# Patient Record
Sex: MALE | Race: WHITE | NOT HISPANIC OR LATINO | Employment: FULL TIME | URBAN - METROPOLITAN AREA
[De-identification: names, ages, dates, MRNs, and addresses within clinical notes are randomized per-mention and may not be internally consistent; named-entity substitution may affect disease eponyms.]

---

## 2019-06-27 ENCOUNTER — TELEPHONE (OUTPATIENT)
Dept: INTERNAL MEDICINE | Facility: CLINIC | Age: 49
End: 2019-06-27

## 2019-06-27 NOTE — TELEPHONE ENCOUNTER
Called patient. No answered. Left message with Primary Care phone number to call back to schedule annual appointment with PCP.    July 3, 2019  1:26 PM    Patient has upcoming appointment on 8/12/19.    Lien Hernandez RN

## 2019-06-27 NOTE — TELEPHONE ENCOUNTER
Email received from Dr. Diallo:    Please have him scheduled for a PE.  JL  ---------- Forwarded message ---------  From: Raphael Gudino <[email or web address]>  Date: Wed, Jun 26, 2019 at 3:50 PM  Subject: Antarctic medical exam  To: Ebenezer Diallo [email or web address]>  Cc: [email or web address]>      Dear Dr. Diallo,    It is time for my Antarctic medical exam - could I please have an appointment   to see you?    Thanks,    Raphael    --   Raphael Gudino - Professor - Physics  [facility]  [address] Physics and Nanotechnology Building  [address] [place] [address]  Tel: [phone]  Fax: [phone]  email: [email or web address]

## 2019-07-30 ENCOUNTER — DOCUMENTATION ONLY (OUTPATIENT)
Dept: CARE COORDINATION | Facility: CLINIC | Age: 49
End: 2019-07-30

## 2019-07-30 DIAGNOSIS — Z02.89 ENCOUNTER FOR OCCUPATIONAL HEALTH ASSESSMENT: Primary | ICD-10-CM

## 2019-08-08 ENCOUNTER — PRE VISIT (OUTPATIENT)
Dept: INTERNAL MEDICINE | Facility: CLINIC | Age: 49
End: 2019-08-08

## 2019-08-08 NOTE — TELEPHONE ENCOUNTER
[facility]  Dept: [phone]     Patient: Raghav Gudino   : 1970  MRN: [id number]  Encounter: 19       Pre Visit Assessment    Health Maintenance Due   Topic Date Due     COLONOSCOPY  1978     PREVENTIVE CARE VISIT  10/19/2017     PHQ-2  2019     ZOSTER IMMUNIZATION (1 of 2) 2018     Chart Reviewed for Labs needed/Health Maintenance: Yes   If labs needed, orders placed:  No labs needed ,   Lab appointment scheduled:  Jade Hawk at 4:12 PM on 2019

## 2019-08-12 ENCOUNTER — TELEPHONE (OUTPATIENT)
Dept: GASTROENTEROLOGY | Facility: CLINIC | Age: 49
End: 2019-08-12

## 2019-08-12 ENCOUNTER — OFFICE VISIT (OUTPATIENT)
Dept: INTERNAL MEDICINE | Facility: CLINIC | Age: 49
End: 2019-08-12
Payer: COMMERCIAL

## 2019-08-12 VITALS
BODY MASS INDEX: 24.23 KG/M2 | WEIGHT: 182.8 LBS | SYSTOLIC BLOOD PRESSURE: 118 MMHG | HEART RATE: 66 BPM | HEIGHT: 73 IN | DIASTOLIC BLOOD PRESSURE: 79 MMHG

## 2019-08-12 DIAGNOSIS — Z56.89 OCCUPATIONAL HEALTH PROBLEM: Primary | ICD-10-CM

## 2019-08-12 DIAGNOSIS — Z12.11 SPECIAL SCREENING FOR MALIGNANT NEOPLASMS, COLON: ICD-10-CM

## 2019-08-12 ASSESSMENT — PAIN SCALES - GENERAL: PAINLEVEL: NO PAIN (0)

## 2019-08-12 ASSESSMENT — MIFFLIN-ST. JEOR: SCORE: 1739.09

## 2019-08-12 NOTE — PROGRESS NOTES
The 10-year ASCVD risk score (Brycepaty ALLISON Jr., et al., 2013) is: 1.8%    Values used to calculate the score:      Age: 48 years      Sex: Male      Is Non- : No      Diabetic: No      Tobacco smoker: No      Systolic Blood Pressure: 118 mmHg      Is BP treated: No      HDL Cholesterol: 62 mg/dL      Total Cholesterol: 165 mg/dL       HPI  48-year-old  presents today for physical examination prior to his annual and ar[place] expedition.  He has the typical list of labs and testing needed for this.  New this year is an EKG a guaiac test.  We also discussed his need for colonoscopy we will get him scheduled for this.  He is been in good health is exercising regularly is doing a combination of strength and cardiovascular training tolerating this well without symptoms or problems.  He is eating healthy he has no specific complaints or concerns today.  Past Medical History:   Diagnosis Date     Anxiety      GERD (gastroesophageal reflux disease)      Past Surgical History:   Procedure Laterality Date     ENT SURGERY      wisdom teeth out     HC ESOPH/GAS REFLUX TEST W NASAL IMPED >1 HR  10/20/2011    Procedure:ESOPHAGEAL IMPEDENCE FUNCTION TEST WITH 24 HOUR PH GREATER THAN 1 HOUR; Surgeon:SIMA CONCEPCION; Location:[facility]     No family history on file.  Social History     Socioeconomic History     Marital status:      Spouse name: None     Number of children: None     Years of education: None     Highest education level: None   Occupational History     Occupation:      Employer: [employer]   Social Needs     Financial resource strain: None     Food insecurity:     Worry: None     Inability: None     Transportation needs:     Medical: None     Non-medical: None   Tobacco Use     Smoking status: Former Smoker     Types: Cigarettes     Smokeless tobacco: Never Used   Substance and Sexual Activity     Alcohol use: Yes     Alcohol/week: 2.5 oz     Types: 5 drink(s) per  "week     Comment: w/e     Drug use: No     Sexual activity: Yes     Partners: Female   Lifestyle     Physical activity:     Days per week: None     Minutes per session: None     Stress: None   Relationships     Social connections:     Talks on phone: None     Gets together: None     Attends Mandaen service: None     Active member of club or organization: None     Attends meetings of clubs or organizations: None     Relationship status: None     Intimate partner violence:     Fear of current or ex partner: None     Emotionally abused: None     Physically abused: None     Forced sexual activity: None   Other Topics Concern      Service Not Asked     Blood Transfusions Not Asked     Caffeine Concern Not Asked     Occupational Exposure Not Asked     Hobby Hazards Not Asked     Sleep Concern Not Asked     Stress Concern Not Asked     Weight Concern Not Asked     Special Diet Yes     Comment: eats healthy     Back Care Not Asked     Exercise Yes     Comment: gym, bike 2-3x/wk     Bike Helmet Not Asked     Seat Belt Not Asked     Self-Exams Not Asked     Parent/sibling w/ CABG, MI or angioplasty before 65F 55M? Not Asked   Social History Narrative     None     Answers for HPI/ROS submitted by the patient on 8/12/2019   General Symptoms: No  Skin Symptoms: No  HENT Symptoms: No  EYE SYMPTOMS: No  HEART SYMPTOMS: No  LUNG SYMPTOMS: No  INTESTINAL SYMPTOMS: No  URINARY SYMPTOMS: No  REPRODUCTIVE SYMPTOMS: No  SKELETAL SYMPTOMS: No  BLOOD SYMPTOMS: No  NERVOUS SYSTEM SYMPTOMS: No  MENTAL HEALTH SYMPTOMS: No    Exam:  /79   Pulse 66   Ht 1.848 m (6' 0.75\")   Wt 82.9 kg (182 lb 12.8 oz)   BMI 24.28 kg/m    182 lbs 12.8 oz  Physical Exam   The patient is alert, oriented with a clear sensorium.   Skin shows no lesions or rashes and good turgor.   Head is normocephalic and atraumatic.   Eyes show PERRLA with benign optic fundi.   Ears show normal TMs bilaterally.   Mouth shows clear oral mucosa.   Neck shows no " nodes, thyromegaly or bruits.   Back is non tender.  Lungs are clear to percussion and auscultation.   Heart shows normal S1 and S2 without murmur or gallop.   Abdomen is soft and nontender without masses or organomegaly.   Genitalia show normal testes. No evidence of inguinal hernia.  Rectal shows small smooth prostate without nodules or masses.  Extremities show no edema and no evidence of active synovitis.   Neurologic examination shows cranial nerves II-XII intact. Motor shows 5/5 strength. Reflexes are symmetric. Cerebellar testing shows normal tandem gait.  Romberg negative.    Ekg reviewed showing no abnormalities      ASSESSMENT  1 [facility] physical examination in good health    Plan  Needs a colonoscopy will do an EKG we will have him follow-up for his necessary labs and he will need to get a flu shot once that becomes available    This note was completed using Dragon voice recognition software.  Although reviewed after completion, some word and grammatical errors may occur.    Ebenezer Diallo MD  General Internal Medicine  [facility]  [phone]

## 2019-08-12 NOTE — PATIENT INSTRUCTIONS
[facility] [phone] ([facility], 4th Floor N)     Colonoscopy [phone]    Please go to the lab on the first floor before you leave today.

## 2019-08-12 NOTE — NURSING NOTE
VISION   Wears contact lenses: worn for testing  Tool used: Snellen Pocket Eye Chart  Right eye:        10/10 (20/20)  Left eye:          10/10 (20/20)  Both Eyes:       10/10 (20/20)

## 2019-08-13 DIAGNOSIS — Z56.89 OCCUPATIONAL HEALTH PROBLEM: ICD-10-CM

## 2019-08-13 DIAGNOSIS — Z02.89 ENCOUNTER FOR OCCUPATIONAL HEALTH ASSESSMENT: ICD-10-CM

## 2019-08-13 LAB
ABO + RH BLD: NORMAL
ABO + RH BLD: NORMAL
ALBUMIN SERPL-MCNC: 3.9 G/DL (ref 3.4–5)
ALP SERPL-CCNC: 65 U/L (ref 40–150)
ALT SERPL W P-5'-P-CCNC: 39 U/L (ref 0–70)
ANION GAP SERPL CALCULATED.3IONS-SCNC: 5 MMOL/L (ref 3–14)
AST SERPL W P-5'-P-CCNC: 25 U/L (ref 0–45)
BASOPHILS # BLD AUTO: 0 10E9/L (ref 0–0.2)
BASOPHILS NFR BLD AUTO: 0.5 %
BILIRUB DIRECT SERPL-MCNC: 0.2 MG/DL (ref 0–0.2)
BILIRUB SERPL-MCNC: 0.8 MG/DL (ref 0.2–1.3)
BLD GP AB SCN SERPL QL: NORMAL
BLOOD BANK CMNT PATIENT-IMP: NORMAL
BUN SERPL-MCNC: 16 MG/DL (ref 7–30)
CALCIUM SERPL-MCNC: 8.6 MG/DL (ref 8.5–10.1)
CHLORIDE SERPL-SCNC: 105 MMOL/L (ref 94–109)
CHOLEST SERPL-MCNC: 172 MG/DL
CO2 SERPL-SCNC: 28 MMOL/L (ref 20–32)
CREAT SERPL-MCNC: 1.06 MG/DL (ref 0.66–1.25)
DIFFERENTIAL METHOD BLD: ABNORMAL
EOSINOPHIL # BLD AUTO: 0.1 10E9/L (ref 0–0.7)
EOSINOPHIL NFR BLD AUTO: 1.6 %
ERYTHROCYTE [DISTWIDTH] IN BLOOD BY AUTOMATED COUNT: 12.2 % (ref 10–15)
GFR SERPL CREATININE-BSD FRML MDRD: 81 ML/MIN/{1.73_M2}
GLUCOSE SERPL-MCNC: 97 MG/DL (ref 70–99)
HBV CORE AB SERPL QL IA: NONREACTIVE
HCT VFR BLD AUTO: 44.4 % (ref 40–53)
HCV AB SERPL QL IA: NONREACTIVE
HDLC SERPL-MCNC: 76 MG/DL
HGB BLD-MCNC: 14.9 G/DL (ref 13.3–17.7)
HIV 1+2 AB+HIV1 P24 AG SERPL QL IA: NONREACTIVE
IMM GRANULOCYTES # BLD: 0 10E9/L (ref 0–0.4)
IMM GRANULOCYTES NFR BLD: 0.3 %
INTERPRETATION ECG - MUSE: NORMAL
LDLC SERPL CALC-MCNC: 85 MG/DL
LYMPHOCYTES # BLD AUTO: 1.3 10E9/L (ref 0.8–5.3)
LYMPHOCYTES NFR BLD AUTO: 34.5 %
MCH RBC QN AUTO: 32 PG (ref 26.5–33)
MCHC RBC AUTO-ENTMCNC: 33.6 G/DL (ref 31.5–36.5)
MCV RBC AUTO: 95 FL (ref 78–100)
MONOCYTES # BLD AUTO: 0.3 10E9/L (ref 0–1.3)
MONOCYTES NFR BLD AUTO: 8.8 %
NEUTROPHILS # BLD AUTO: 2 10E9/L (ref 1.6–8.3)
NEUTROPHILS NFR BLD AUTO: 54.3 %
NONHDLC SERPL-MCNC: 96 MG/DL
NRBC # BLD AUTO: 0 10*3/UL
NRBC BLD AUTO-RTO: 0 /100
PLATELET # BLD AUTO: 207 10E9/L (ref 150–450)
POTASSIUM SERPL-SCNC: 4.2 MMOL/L (ref 3.4–5.3)
PROT SERPL-MCNC: 7.2 G/DL (ref 6.8–8.8)
RBC # BLD AUTO: 4.66 10E12/L (ref 4.4–5.9)
SODIUM SERPL-SCNC: 138 MMOL/L (ref 133–144)
SPECIMEN EXP DATE BLD: NORMAL
T PALLIDUM AB SER QL: NONREACTIVE
TRIGL SERPL-MCNC: 58 MG/DL
WBC # BLD AUTO: 3.7 10E9/L (ref 4–11)

## 2019-08-15 LAB
GAMMA INTERFERON BACKGROUND BLD IA-ACNC: 0.03 IU/ML
M TB IFN-G BLD-IMP: NEGATIVE
M TB IFN-G CD4+ BCKGRND COR BLD-ACNC: >10 IU/ML
MITOGEN IGNF BCKGRD COR BLD-ACNC: 0.02 IU/ML
MITOGEN IGNF BCKGRD COR BLD-ACNC: 0.11 IU/ML

## 2019-08-16 DIAGNOSIS — Z56.89 OCCUPATIONAL HEALTH PROBLEM: ICD-10-CM

## 2019-08-16 PROCEDURE — 82272 OCCULT BLD FECES 1-3 TESTS: CPT

## 2019-08-16 PROCEDURE — 82274 ASSAY TEST FOR BLOOD FECAL: CPT | Performed by: INTERNAL MEDICINE

## 2019-08-21 LAB — HEMOCCULT STL QL IA: NEGATIVE

## 2019-10-08 ENCOUNTER — HOSPITAL ENCOUNTER (OUTPATIENT)
Facility: CLINIC | Age: 49
Setting detail: SPECIMEN
Discharge: HOME | End: 2019-10-08
Admitting: INTERNAL MEDICINE
Payer: COMMERCIAL

## 2019-10-08 PROCEDURE — 86705 HEP B CORE ANTIBODY IGM: CPT | Performed by: INTERNAL MEDICINE

## 2019-10-08 PROCEDURE — 36415 COLL VENOUS BLD VENIPUNCTURE: CPT | Performed by: INTERNAL MEDICINE

## 2019-10-08 PROCEDURE — 86704 HEP B CORE ANTIBODY TOTAL: CPT | Performed by: INTERNAL MEDICINE

## 2019-10-10 ENCOUNTER — APPOINTMENT (OUTPATIENT)
Dept: LAB | Facility: CLINIC | Age: 49
End: 2019-10-10
Payer: COMMERCIAL

## 2019-10-10 LAB
HBV CORE AB SERPL QL IA: NONREACTIVE
HBV CORE IGM SERPL QL IA: NONREACTIVE

## 2019-10-22 DIAGNOSIS — Z56.89 OCCUPATIONAL HEALTH PROBLEM: Primary | ICD-10-CM

## 2019-10-25 ENCOUNTER — TRANSFERRED RECORDS (OUTPATIENT)
Dept: HEALTH INFORMATION MANAGEMENT | Facility: CLINIC | Age: 49
End: 2019-10-25

## 2019-10-29 DIAGNOSIS — K21.9 GASTROESOPHAGEAL REFLUX DISEASE WITHOUT ESOPHAGITIS: ICD-10-CM

## 2020-02-21 ENCOUNTER — TRANSFERRED RECORDS (OUTPATIENT)
Dept: HEALTH INFORMATION MANAGEMENT | Facility: CLINIC | Age: 50
End: 2020-02-21

## 2020-02-26 DIAGNOSIS — K36 OTHER APPENDICITIS: Primary | ICD-10-CM

## 2020-02-27 ENCOUNTER — TELEPHONE (OUTPATIENT)
Dept: INTERNAL MEDICINE | Facility: CLINIC | Age: 50
End: 2020-02-27

## 2020-03-01 ENCOUNTER — HEALTH MAINTENANCE LETTER (OUTPATIENT)
Age: 50
End: 2020-03-01

## 2020-03-03 DIAGNOSIS — K36 OTHER APPENDICITIS: Primary | ICD-10-CM

## 2020-03-04 ENCOUNTER — PATIENT OUTREACH (OUTPATIENT)
Dept: SURGERY | Facility: CLINIC | Age: 50
End: 2020-03-04

## 2020-03-04 ENCOUNTER — ANCILLARY PROCEDURE (OUTPATIENT)
Dept: CT IMAGING | Facility: CLINIC | Age: 50
End: 2020-03-04
Attending: INTERNAL MEDICINE
Payer: COMMERCIAL

## 2020-03-04 DIAGNOSIS — K36 OTHER APPENDICITIS: ICD-10-CM

## 2020-03-04 DIAGNOSIS — Z56.89 OCCUPATIONAL HEALTH PROBLEM: ICD-10-CM

## 2020-03-04 DIAGNOSIS — Z56.89 OCCUPATIONAL HEALTH PROBLEM: Primary | ICD-10-CM

## 2020-03-04 LAB
ALBUMIN SERPL-MCNC: 3.7 G/DL (ref 3.4–5)
ALP SERPL-CCNC: 93 U/L (ref 40–150)
ALT SERPL W P-5'-P-CCNC: 99 U/L (ref 0–70)
ANION GAP SERPL CALCULATED.3IONS-SCNC: 2 MMOL/L (ref 3–14)
AST SERPL W P-5'-P-CCNC: 38 U/L (ref 0–45)
BILIRUB SERPL-MCNC: 0.3 MG/DL (ref 0.2–1.3)
BUN SERPL-MCNC: 15 MG/DL (ref 7–30)
CALCIUM SERPL-MCNC: 9.2 MG/DL (ref 8.5–10.1)
CHLORIDE SERPL-SCNC: 105 MMOL/L (ref 94–109)
CO2 SERPL-SCNC: 30 MMOL/L (ref 20–32)
CREAT SERPL-MCNC: 0.99 MG/DL (ref 0.66–1.25)
CRP SERPL-MCNC: 3.5 MG/L (ref 0–8)
ERYTHROCYTE [DISTWIDTH] IN BLOOD BY AUTOMATED COUNT: 11.9 % (ref 10–15)
ERYTHROCYTE [SEDIMENTATION RATE] IN BLOOD BY WESTERGREN METHOD: 6 MM/H (ref 0–20)
GFR SERPL CREATININE-BSD FRML MDRD: 88 ML/MIN/{1.73_M2}
GLUCOSE SERPL-MCNC: 101 MG/DL (ref 70–99)
HCT VFR BLD AUTO: 46.5 % (ref 40–53)
HGB BLD-MCNC: 15.2 G/DL (ref 13.3–17.7)
MCH RBC QN AUTO: 31.2 PG (ref 26.5–33)
MCHC RBC AUTO-ENTMCNC: 32.7 G/DL (ref 31.5–36.5)
MCV RBC AUTO: 96 FL (ref 78–100)
PLATELET # BLD AUTO: 331 10E9/L (ref 150–450)
POTASSIUM SERPL-SCNC: 4.9 MMOL/L (ref 3.4–5.3)
PROT SERPL-MCNC: 7.3 G/DL (ref 6.8–8.8)
RADIOLOGIST FLAGS: ABNORMAL
RBC # BLD AUTO: 4.87 10E12/L (ref 4.4–5.9)
SODIUM SERPL-SCNC: 137 MMOL/L (ref 133–144)
WBC # BLD AUTO: 6.7 10E9/L (ref 4–11)

## 2020-03-04 RX ORDER — IOPAMIDOL 755 MG/ML
112 INJECTION, SOLUTION INTRAVASCULAR ONCE
Status: COMPLETED | OUTPATIENT
Start: 2020-03-04 | End: 2020-03-04

## 2020-03-04 RX ADMIN — IOPAMIDOL 112 ML: 755 INJECTION, SOLUTION INTRAVASCULAR at 14:29

## 2020-03-04 NOTE — PROGRESS NOTES
Patient is scheduled to see Dr. Amin in clinic tomorrow, 3/4. RN has left 2 message with patient regarding his upcoming appointment. Looking for records and images from outside hospital related to his diagnosis of acute appendicitis. Patient has had labs and a CT scan ordered by PCP, but unsure as to the history of this. Results of CT back today with urgent findings. Called and discussed this with Dr. Amin and he recommends patient be seen in the ED based on these findings.     Called patient to discuss findings. He states he has been traveling in [place] and was seen in the hospital and started on antibiotics. He said he feels better now than he did 10 days ago. Discussed with patient that the CT findings show a possible developing fluid collection. He said he would prefer to wait and be seen tomorrow instead of going to the ED. He is aware of signs/symptoms to watch for that would indicate he needs to be evaluated sooner than his scheduled appointment tomorrow.

## 2020-03-05 ENCOUNTER — OFFICE VISIT (OUTPATIENT)
Dept: SURGERY | Facility: CLINIC | Age: 50
End: 2020-03-05
Payer: COMMERCIAL

## 2020-03-05 VITALS
WEIGHT: 183.6 LBS | HEART RATE: 68 BPM | HEIGHT: 73 IN | SYSTOLIC BLOOD PRESSURE: 125 MMHG | OXYGEN SATURATION: 98 % | BODY MASS INDEX: 24.33 KG/M2 | DIASTOLIC BLOOD PRESSURE: 76 MMHG | TEMPERATURE: 98.2 F

## 2020-03-05 DIAGNOSIS — K35.30 ACUTE APPENDICITIS WITH LOCALIZED PERITONITIS, UNSPECIFIED WHETHER ABSCESS PRESENT, UNSPECIFIED WHETHER GANGRENE PRESENT, UNSPECIFIED WHETHER PERFORATION PRESENT: Primary | ICD-10-CM

## 2020-03-05 LAB — HBV CORE AB SERPL QL IA: NONREACTIVE

## 2020-03-05 ASSESSMENT — ENCOUNTER SYMPTOMS
DECREASED APPETITE: 0
FATIGUE: 0
DIZZINESS: 0
WEIGHT GAIN: 0
TINGLING: 0
WEAKNESS: 0
SEIZURES: 0
BLOOD IN STOOL: 0
CONSTIPATION: 1
HEADACHES: 0
LIGHT-HEADEDNESS: 0
BLOATING: 1
EYE WATERING: 0
SPUTUM PRODUCTION: 0
POSTURAL DYSPNEA: 0
MUSCLE WEAKNESS: 0
JOINT SWELLING: 0
SINUS PAIN: 0
LOSS OF CONSCIOUSNESS: 0
SNORES LOUDLY: 0
ARTHRALGIAS: 0
SLEEP DISTURBANCES DUE TO BREATHING: 0
EXERCISE INTOLERANCE: 0
VOMITING: 0
TROUBLE SWALLOWING: 0
EYE PAIN: 0
NAIL CHANGES: 0
DIFFICULTY URINATING: 0
EXTREMITY NUMBNESS: 0
SMELL DISTURBANCE: 0
NUMBNESS: 0
TREMORS: 0
NERVOUS/ANXIOUS: 0
NIGHT SWEATS: 0
FLANK PAIN: 0
INCREASED ENERGY: 0
MEMORY LOSS: 0
INSOMNIA: 0
COUGH: 0
WEIGHT LOSS: 0
HEARTBURN: 0
HYPOTENSION: 0
LEG PAIN: 0
LEG SWELLING: 0
DISTURBANCES IN COORDINATION: 0
MYALGIAS: 0
HALLUCINATIONS: 0
DEPRESSION: 0
NECK PAIN: 0
STIFFNESS: 0
HYPERTENSION: 0
MUSCLE CRAMPS: 0
DOUBLE VISION: 0
CLAUDICATION: 0
WHEEZING: 0
BRUISES/BLEEDS EASILY: 0
NECK MASS: 0
DIARRHEA: 0
HEMOPTYSIS: 0
DECREASED CONCENTRATION: 0
BACK PAIN: 0
SINUS CONGESTION: 0
DYSURIA: 0
POLYPHAGIA: 0
EYE REDNESS: 0
SORE THROAT: 0
POOR WOUND HEALING: 0
PALPITATIONS: 0
SPEECH CHANGE: 0
SKIN CHANGES: 0
EYE IRRITATION: 0
COUGH DISTURBING SLEEP: 0
NAUSEA: 0
ABDOMINAL PAIN: 1
CHILLS: 0
SHORTNESS OF BREATH: 0
SYNCOPE: 0
ORTHOPNEA: 0
HOARSE VOICE: 0
FEVER: 0
SWOLLEN GLANDS: 0
DYSPNEA ON EXERTION: 0
POLYDIPSIA: 0
TACHYCARDIA: 0
HEMATURIA: 0
ALTERED TEMPERATURE REGULATION: 0
TASTE DISTURBANCE: 0
PARALYSIS: 0
RECTAL PAIN: 0
RESPIRATORY PAIN: 0
PANIC: 0

## 2020-03-05 ASSESSMENT — PAIN SCALES - GENERAL: PAINLEVEL: NO PAIN (0)

## 2020-03-05 ASSESSMENT — MIFFLIN-ST. JEOR: SCORE: 1737.71

## 2020-03-05 NOTE — NURSING NOTE
"Chief Complaint   Patient presents with     Consult     Appointment for appendicitis.       Vitals:    03/05/20 0702   BP: 125/76   BP Location: Left arm   Patient Position: Sitting   Cuff Size: Adult Regular   Pulse: 68   Temp: 98.2  F (36.8  C)   SpO2: 98%   Weight: 83.3 kg (183 lb 9.6 oz)   Height: 1.848 m (6' 0.75\")       Body mass index is 24.39 kg/m .                            EVELIN DIAMOND, EMT      "

## 2020-03-05 NOTE — LETTER
3/5/2020       RE: Raghav Gudino  [address]  [place] [facility] [address]     Dear Colleague,    Thank you for referring your patient, Raghav Gudino, to the [facility] GENERAL SURGERY at [facility]. Please see a copy of my visit note below.        New General Surgery Consultation Note        Raghav Gudino  [id number]  1970  March 5, 2020     Requesting Provider: Ebenezer Diallo     Dear Dr Diallo,      I had the pleasure of seeing your patient, Raghav Gudino.  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     CHIEF COMPLAINT:  Chief Complaint Reviewed With Patient 3/5/2020   I am here today to be seen for: appendicitis       HISTORY OF PRESENT ILLNESS:    Was traveling in [place] several weeks ago when he noticed that something just wasn't quite right about his abdomen.  Was seen in the ED in [facility] in  two Fridays ago and CRP elevated at 181; wbc nl; CT scan showed appendicitis with large appendicolith (14mm).    Was slated for appendectomy, but deferred due to more urgent cases.  Discharged with antibiotics.    Pain markedly improved now.    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    LOCATION:  Right lower abdomen.    SEVERITY:   Severity of Present Illness Reviewed With Patient 3/5/2020   How would you describe your symptoms? Mild   Does your current concern alter your level of activities? Yes       TIMING:  Timing of Present Illness Reviewed With Patient 3/5/2020   The onset of my symptoms was: Gradual   My symptoms are: Intermittent (come and go)   My symptoms have been: Improving       DURATION:  No flowsheet data found.     MODIFYING FACTORS:  Modifying Factors Reviewed With Patient 3/5/2020   My symptoms improve or resolve with: antibiotics       ASSOCIATED SIGNS/SYMPTOMS:       Review of Systems     Constitutional:  Negative for fever, chills, weight loss,  weight gain, fatigue, decreased appetite, night sweats, recent stressors, height gain, height loss, post-operative complications, incisional pain, hallucinations, increased energy, hyperactivity and confused.   HENT:  Negative for ear pain, hearing loss, tinnitus, nosebleeds, trouble swallowing, hoarse voice, mouth sores, sore throat, ear discharge, tooth pain, gum tenderness, taste disturbance, smell disturbance, hearing aid, bleeding gums, dry mouth, sinus pain, sinus congestion and neck mass.    Eyes:  Negative for double vision, pain, redness, eye pain, decreased vision, eye watering, eye bulging, eye dryness, flashing lights, spots, floaters, strabismus, tunnel vision, jaundice and eye irritation.   Respiratory:   Negative for cough, hemoptysis, sputum production, shortness of breath, wheezing, sleep disturbances due to breathing, snores loudly, respiratory pain, dyspnea on exertion, cough disturbing sleep and postural dyspnea.    Cardiovascular:  Negative for chest pain, dyspnea on exertion, palpitations, orthopnea, claudication, leg swelling, fingers/toes turn blue, hypertension, hypotension, syncope, history of heart murmur, chest pain on exertion, chest pain at rest, pacemaker, few scattered varicosities, leg pain, sleep disturbances due to breathing, tachycardia, light-headedness, exercise intolerance and edema.   Gastrointestinal:  Positive for abdominal pain, constipation and bloating. Negative for heartburn, nausea, vomiting, diarrhea, blood in stool, melena and rectal pain.   Genitourinary:  Negative for bladder incontinence, dysuria, urgency, hematuria, flank pain, difficulty urinating, nocturia, voiding less frequently, scrotal pain, ulcerations, penile discharge, male genitourinary complaint and reduced libido.   Musculoskeletal:  Negative for myalgias, back pain, joint swelling, arthralgias, stiffness, muscle cramps, neck pain, bone pain, muscle weakness and fracture.   Skin:  Negative for nail  changes, itching, poor wound healing, rash, hair changes, skin changes, acne, warts, poor wound healing, scarring, flaky skin, Raynaud's phenomenon, sensitivity to sunlight and skin thickening.   Neurological:  Negative for dizziness, tingling, tremors, speech change, seizures, loss of consciousness, weakness, light-headedness, numbness, headaches, disturbances in coordination, extremity numbness, memory loss, difficulty walking and paralysis.   Endo/Heme:  Negative for anemia, swollen glands and bruises/bleeds easily.   Psychiatric/Behavioral:  Negative for depression, hallucinations, memory loss, decreased concentration, mood swings and panic attacks.    Endocrine:  Negative for altered temperature regulation, polyphagia, polydipsia, unwanted hair growth and change in facial hair.      PAST MEDICAL HISTORY:  Past Medical History:   Diagnosis Date     Anxiety      GERD (gastroesophageal reflux disease)         PAST SURGICAL HISTORY:  Past Surgical History:   Procedure Laterality Date     ENT SURGERY      wisdom teeth out     HC ESOPH/GAS REFLUX TEST W NASAL IMPED >1 HR  10/20/2011    Procedure:ESOPHAGEAL IMPEDENCE FUNCTION TEST WITH 24 HOUR PH GREATER THAN 1 HOUR; Surgeon:SIMA CONCEPCION; Location: GI        MEDICATIONS:  Current Outpatient Medications   Medication     omeprazole (PRILOSEC) 20 MG DR capsule     No current facility-administered medications for this visit.         ALLERGIES:  No Known Allergies     SOCIAL HISTORY:  Social History     Socioeconomic History     Marital status:      Spouse name: None     Number of children: None     Years of education: None     Highest education level: None   Occupational History     Occupation:      Employer: [employer]   Social Needs     Financial resource strain: None     Food insecurity:     Worry: None     Inability: None     Transportation needs:     Medical: None     Non-medical: None   Tobacco Use     Smoking status: Former Smoker      "Types: Cigarettes     Smokeless tobacco: Never Used   Substance and Sexual Activity     Alcohol use: Yes     Alcohol/week: 4.2 standard drinks     Types: 5 drink(s) per week     Comment: w/e     Drug use: No     Sexual activity: Yes     Partners: Female   Lifestyle     Physical activity:     Days per week: None     Minutes per session: None     Stress: None   Relationships     Social connections:     Talks on phone: None     Gets together: None     Attends Latter day service: None     Active member of club or organization: None     Attends meetings of clubs or organizations: None     Relationship status: None     Intimate partner violence:     Fear of current or ex partner: None     Emotionally abused: None     Physically abused: None     Forced sexual activity: None   Other Topics Concern      Service Not Asked     Blood Transfusions Not Asked     Caffeine Concern Not Asked     Occupational Exposure Not Asked     Hobby Hazards Not Asked     Sleep Concern Not Asked     Stress Concern Not Asked     Weight Concern Not Asked     Special Diet Yes     Comment: eats healthy     Back Care Not Asked     Exercise Yes     Comment: gym, bike 2-3x/wk     Bike Helmet Not Asked     Seat Belt Not Asked     Self-Exams Not Asked     Parent/sibling w/ CABG, MI or angioplasty before 65F 55M? Not Asked   Social History Narrative     None       FAMILY HISTORY:  No family history on file.     PHYSICAL EXAM:  Vital Signs: /76 (BP Location: Left arm, Patient Position: Sitting, Cuff Size: Adult Regular)   Pulse 68   Temp 98.2  F (36.8  C)   Ht 1.848 m (6' 0.75\")   Wt 83.3 kg (183 lb 9.6 oz)   SpO2 98%   BMI 24.39 kg/m    HEENT: NCAT; MMM;   Lungs: Breathing unlabored  Abdomen: normal     PHYSICAL EXAM AREA OF INTEREST:  abdomen     PERTINENT IMAGING/TESTING:  CT scan results reviewed from  and from yesterday here at [facility].    phlegmonomous area around appendix with large appendicolith.    LABORATORY TESTING:  Wbc and CRP " now normal.    ASSESSMENT:   Raghav Gudino is a 49 year old male with subacute appendicitis; symptoms now for 3 weeks; diagnosed and treated with antibiotics initially in [place] 13-14 days ago.    Overall feels better now and functioning normally.     DISCUSSION OF RISKS:  We reviewed risks of waiting for operating now.  Favors waiting by a lot due to severe inflammation in the area.    PLAN:   Laparoscopic appendectomy in 4+ weeks.  Can be done according to  Terese's class schedule.    Augmentin prescribed for 7 more days to be taken as needed for worsening symptoms.  Otherwise, antibiotics course is now completed.    Orders Placed This Encounter   Procedures     PAC Visit Referral (For [facility] Only)       Sincerely,     Blair Amin MD         Exam Information     Exam Date Exam Time Accession # Performing Department Results    3/4/20  2:45 PM [id number] [facility] CT    PACS Images      Show images for CT Abdomen Pelvis w Contrast   Study Result     Exam Type: CT ABDOMEN PELVIS W CONTRAST  Date and Time: 3/4/2020 2:45 PM  History: Appendicitis, known/chronic, recent course of antibiotics  Comparison: None     Procedure:   Helical  CT images were obtained with IV contrast.  Contrast dose: 112 ml of Isovue-370 contrast was injected  intravenously.     Radiation Dose (DLP): 343 mGy*cm     FINDINGS:     LOWER CHEST: 3 mm left lower lobe solid pulmonary nodule (series 3  image 6). Otherwise unremarkable.     ABDOMEN: Appendix is enlarged and demonstrates prominent mucosal  enhancement, thickening, and periappendiceal inflammatory changes.  There is a hyperdense stone in the appendix measuring 1.2 x 0.8 cm  (series 3 image 285). Adjacent to the appendiceal tip, there is a 1.5  x 1.6 cm hypodense structure with a thin soft tissue rim (series 3  image 284). Trace right lower quadrant and pelvic fluid. No free  abdominal air. Few small reactive appearing left lower quadrant  lymph  nodes.      No dilated loops of bowel. Colonic diverticulosis without  diverticulitis. Mild rectal wall thickening. In the subcapsular right  hepatic lobe, there is a subcentimeter hypodensity with an internal  calcification, too small to accurately characterize (series 3 image  116). Pancreas, gallbladder, spleen, adrenal glands, kidneys, ureters,  and bladder appear within normal limits. Normal caliber aorta.     BONES AND SOFT TISSUES: No suspicious lesions.                                                                      IMPRESSION:   1. Persistent appendicitis with significant periappendiceal  inflammatory changes and a 1.2 cm appendicolith. Adjacent to the  appendix there is a 1.6 cm contained fluid structure which is  suspicious for a small phlegmon/developing abscess. No free abdominal  air.  2. 3 mm left lower lobe solid pulmonary nodule without suspicious  features. Recommend follow-up per Fleischner Society criteria  guidelines.     [Urgent Result: Acute appendicitis]     Finding was identified on 3/4/2020 2:33 PM.      Dr. Diallo was contacted by Dr. Daniele Patino at 3/4/2020 2:46 PM  and verbalized understanding of the urgent finding.      I have personally reviewed the examination and initial interpretation  and I agree with the findings.     SHERRI ODELL MD       Again, thank you for allowing me to participate in the care of your patient.      Sincerely,    Blair Amin MD

## 2020-03-05 NOTE — LETTER
Raghav Gudino  [address]  [facility] [address]      SURGERY PACKET            Your surgery is scheduled:    Date: TBD  ________________________________    Time: TBD  ________________________________    Please arrive at:  TBD  ______________________    Surgeon's Name: Dr. Amin  _______________________        Pre-Op Physical Fax Numbers:         [facility] Fax:  [phone] / Phone:  [phone]        Your surgery is located at:      [facility], [address] [id number] [phone]      [email or web address]       Before Your Surgery  For Patients and Visitors at [facility]    Welcome  As you get ready for surgery, you may have a lot of questions.  This brochure will help you know what to expect before and after surgery.  You and your family are the most important members of your health care team.  You will need to take an active role in your care.    Be sure to ask questions and learn all that you can about your surgery.  If you have any safety concerns, we urge you to tell a nurse as soon as possible.   This brochure is for information only.  It does not replace the advice of your doctor.  Always follow your doctor's advice.    Please tell us if you need a .    GETTING READY FOR SURGERY  Always follow your surgeon's instructions.  If you don't, your surgery could be canceled.  Please use the following checklist.    Within 30 Days of Surgery:    Have a pre-surgery physical exam with your family doctor or partner.    If you use a [facility], all of your information from the pre-op physical will be in the [employer] computer system.    Ask the doctor to send all of your results to the hospital before the surgery.  The doctor also may ask you to bring the results with you on the day of surgery or you can fax them to [facility] Fax:  [phone] /  Phone:  [phone].  Tell the doctor if:    You are allergic to latex or rubber  (Latex and rubber gloves are often used in medical care).    You are taking any medicines (including aspirin), vitamins (Vitamin E, Fish Oil, Omegas) or herbal products.  You will need to stop taking some medicines before surgery.    You have any medical problems (allergies, diabetes or heart disease, for example).    You have a pacemaker or an AICD (automatic implanted cardiac defibrillator).  If you do, please bring the ID card with you on the day of surgery.    You are a smoker.  People who smoke have a higher risk of infection after surgery.  Ask your doctor how you can quit smoking.  Within 7 days of Surgery:    Pre-register with the hospital.  Please use the hospital's phone number, [phone]. Or, to register online, go to [email or web address].      Prior to your surgical procedure, a nurse will be contacting you to obtain a health history [facility] Fax:  [phone] / Phone:  [phone].  Additionally, someone from the Admissions Department will also contact you for preregistration ([phone]).      Call your insurance company.  Ask if you need pre-approval for your surgery.  If you do not have insurance, please let us know.      Arrange for someone to drive you home after surgery.  If you will have same-day surgery, you may not drive or take public transportation home by yourself.    Arrange for someone to stay with you for 24 hours after you go home.  This person must be a responsible adult (ie- Family member or friend).  The Day Before Surgery:     Call your surgeon if there are any changes in your health.  This includes signs of a cold or flu (such as a sore throat, runny nose, cough, rash or fever).    Do not smoke, drink alcohol or take over-the-counter medicine (unless your surgeon tells you to) for 24 hours before and after surgery.    If you take prescribed drugs:  You may need to stop them until after  the surgery.  Follow your doctor's orders.  You may resume Aspirin and/or blood thinners after your surgery as directed by your physician/surgeon.    NO FOOD OR DRINK AFTER MIDNIGHT.  Follow your surgeon's orders for eating and drinking.  You need to have an empty stomach before surgery.  This will make the surgery as safe as possible.  If you don't follow your doctor's orders, your surgery could be changed to another date.  A nurse will call you within a few days of surgery to go over these and other instructions.  If you do not hear from them, please call them at [facility] Fax:  [phone] / Phone:  [phone]  The Day of Surgery:    Take a shower or bath the night before and the morning of surgery.  Use antiseptic soap or the soap your surgeon gave you.  Gently clean the skin.  Do not shave or scrub your surgery site.    Please remove deodorant, cologne, scented lotion, makeup, nail polish and jewelry (including rings and body piercings).  If you wear artificial nails, please remove at least one nail before coming to the hospital.    Wear clean, loose clothing to the hospital.    Bring these items to the hospital:  1. Your insurance card.  2. Money for parking and co-pays (for medicines or the surgery), if needed.   3. A list of all the medicines you take.  Include vitamins, minerals, herbs and over-the-counter drugs.  Note any drug allergies.  4. A copy of your advance health care directive, if you have one.  This tells us what treatment you would want -- and who would make health care decisions -- if you could no longer speak for yourself.  You may request this form in advance or download it from [email or web address].  5. A case for any glasses, contact lenses, hearing aids or dentures.  6. Your inhaler or CPAP machine, if you use these at home.  Leave extra cash, jewelry and other valuables at home.    When You Arrive:  When you get to the hospital, you will:    Check in.  If you are under age 18,  you must be with a parent or legal guardian.    Sign consent forms, if you haven't already.  These forms state that you know the risks and benefits of surgery.  When you sign the forms, you give us permission to do the surgery.  Do not sign them unless you understand what will happen during and after your surgery.  If you have any questions about your surgery, ask to speak with your doctor before you sign the forms.  If you don't understand the answers, ask again.    Receive a copy of the Patients Bill of Rights.  If you do not receive a copy, please ask for one.    Change into hospital clothes.  Your belongings will be placed in a bag.  We will return them to you after surgery.    Meet with the anesthesia provider.  He or she will tell you what kind of anesthesia (medicine) will be used to keep you comfortable during surgery.  Remember: It's okay to remind doctors and nurses to wash their hands before touching you.   In most cases, your surgeon will use a marker to write his or her initials on the surgery site.  This ensures that the exact site is operated on.  For safety reasons, we will ask you the same questions many times.  For example, we may ask your name and birth date over and over again.  Friends and family can stay with you until it's time for surgery.  While you're in surgery, they will be in the waiting area.  Please note that cell phones are not allowed in some patient care areas.  If you have questions about what will happen in the operating room, talk to your care team.  After Surgery:    We will move you to a recovery room where we will watch you closely.  If you have any pain or discomfort, tell your nurse.  He or she will try to make you comfortable.      If you are staying overnight we will move you to your hospital room after you are awake.    If you are going home we will move you to another room.  Friends and family may be able to join you.  The length of time you spend in recovery depends on  "the type of medicine you received, your medical condition, and the type of surgery you had.  Dealing with pain:  A nurse will check your comfort level often during your stay.  He or she will work with you to manage your pain.  Remember:    All pain is real.  There are many ways to control pain.  We can help you decide what works best for you.    Ask for pain medicine when you need it.  Don't try to \"tough it out\" -- this can make you feel worse.  Always take your medicine as ordered.    Medicine doesn't work the same for everyone.  If your medicine isn't working tell your nurse.  There may be other medicines or treatments we can try.  Going Home:  We will let you know when you're ready to leave the hospital.  Before you leave, we will tell you how to care for yourself at home and prevent infections.  If you do not understand something, please say so.  We will answer any questions you have.  We will then help you get ready to leave.  You must have an adult with you for the first 24 hours after you leave the hospital. Take it easy when you get home.  You will need some time to recover -- you may be more tired than you realize at first.  Rest and relax for at least the first 24 hours at home.  You'll feel better and heal faster if you take good care of yourself.                      Pre-Operative Surgery Scrub    Purpose:   The skin harbors a large variety of bacteria, both infectious and noninfectious.  Showering with an antiseptic soap prior to an invasive procedure will decrease the number of transient and resident (good and bad) bacteria that is normally found on the skin.    Procedure:      Shower or bathe with 1/2 of the bottle of antiseptic soap (enclosed) the evening before and 1/2 of the bottle the morning of surgery (bathing the day of the procedure is most important).       Apply the soap at full strength (use the entire bottle).  Gently clean the skin, rinse, and dry with a clean towel that is freshly " laundered (out of the dryer) and then put on clean clothing that is freshly laundered.        We have given you information regarding pre-op showering.  We recommend that patients wash with an antiseptic soap prior to surgery.  This cleanser will be given to you at the clinic or mailed to your home.  It is advised that you liberally wash the specific area surgery area the night before, and again in the morning before the surgery.  Do not apply lotion afterward.  We would like to keep the skin as clean as possible.    Thank you for following these important instructions.      You have been scheduled for surgery and we would like to give you some information that will assist in helping get the best possible outcome.      Before Surgery:   If for any reason you decide not to have the surgery, please contact our office.  We can easily cancel or reschedule the procedure. Please call the  at [phone].      Any pain related to the surgery that occurs before the surgery needs to be reported and managed by your primary care or referring doctor.      Please keep in mind that the time of surgery is subject to change.  Make sure you have nothing to eat or drink after midnight.  If your surgery is later in the afternoon, this recommendation might change, but not until the day before surgery after the actual time of the surgery has been established.    After Surgery:  When you are discharged from the recovery room, the nurses will review instructions with you and your caregiver.      Please wash your hands every time you touch the wound or change bandages or dressings.      Do not submerge the wound in water.  You may not use a bathtub or hot tub until the wound is closed.  The wait time frame is generally 2-3 weeks but any open area can be a source of incoming bacteria, so it is better to be on the safe side and avoid the tub until your wound is fully healed.      You may take a shower 24 hours after surgery.   Double check with your surgeon if it is ok for water to run over a wound, whether it has been sutured, stapled, glued or is open.  You may gently wash the wound using the antiseptic soap provided for your pre-surgery showering (do not use a washcloth).  Any mild soap will work as well.      Many surgical wounds will have small white strips of tape on them called Steri Strips.  Do not remove these.  The edges will curl and fall off within 7-10 days with normal showering.      If you are going home with sutures (stitches) or staples, you must return to the clinic to have them taken out, usually within 1-2 weeks.      Signs and symptoms of infection include:  1. Fever, temperature over 101.5 ' F  2. Redness  3. Swelling  4. Increasing pain  5. Green or yellow drainage which may or may not have a foul odor.    Symptoms of infection need to be reported to your surgery office. Please call the nurse at [phone].   If you have had surgery with Dr. Amin or Dr. Lopez please call [phone] (option # 4).    If you or your  are deaf or hard of hearing, or prefer a language other than English, please let us know.  We have many free services, including interpreters and other aids to help you communicate. You may ask for help  through any member of your care team or by calling Language Services at [phone], option 2.

## 2020-03-05 NOTE — PROGRESS NOTES
New General Surgery Consultation Note        Raghav Gudino  [id number]  1970  March 5, 2020     Requesting Provider: Ebenezer Diallo     Dear Dr Diallo,      I had the pleasure of seeing your patient, Raghav Gudino.  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     CHIEF COMPLAINT:  Chief Complaint Reviewed With Patient 3/5/2020   I am here today to be seen for: appendicitis       HISTORY OF PRESENT ILLNESS:    Was traveling in [place] several weeks ago when he noticed that something just wasn't quite right about his abdomen.  Was seen in the ED in [facility] in  two Fridays ago and CRP elevated at 181; wbc nl; CT scan showed appendicitis with large appendicolith (14mm).    Was slated for appendectomy, but deferred due to more urgent cases.  Discharged with antibiotics.    Pain markedly improved now.    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    LOCATION:  Right lower abdomen.    SEVERITY:   Severity of Present Illness Reviewed With Patient 3/5/2020   How would you describe your symptoms? Mild   Does your current concern alter your level of activities? Yes       TIMING:  Timing of Present Illness Reviewed With Patient 3/5/2020   The onset of my symptoms was: Gradual   My symptoms are: Intermittent (come and go)   My symptoms have been: Improving       DURATION:  No flowsheet data found.     MODIFYING FACTORS:  Modifying Factors Reviewed With Patient 3/5/2020   My symptoms improve or resolve with: antibiotics       ASSOCIATED SIGNS/SYMPTOMS:       Review of Systems     Constitutional:  Negative for fever, chills, weight loss, weight gain, fatigue, decreased appetite, night sweats, recent stressors, height gain, height loss, post-operative complications, incisional pain, hallucinations, increased energy, hyperactivity and confused.   HENT:  Negative for ear pain, hearing loss, tinnitus, nosebleeds, trouble  swallowing, hoarse voice, mouth sores, sore throat, ear discharge, tooth pain, gum tenderness, taste disturbance, smell disturbance, hearing aid, bleeding gums, dry mouth, sinus pain, sinus congestion and neck mass.    Eyes:  Negative for double vision, pain, redness, eye pain, decreased vision, eye watering, eye bulging, eye dryness, flashing lights, spots, floaters, strabismus, tunnel vision, jaundice and eye irritation.   Respiratory:   Negative for cough, hemoptysis, sputum production, shortness of breath, wheezing, sleep disturbances due to breathing, snores loudly, respiratory pain, dyspnea on exertion, cough disturbing sleep and postural dyspnea.    Cardiovascular:  Negative for chest pain, dyspnea on exertion, palpitations, orthopnea, claudication, leg swelling, fingers/toes turn blue, hypertension, hypotension, syncope, history of heart murmur, chest pain on exertion, chest pain at rest, pacemaker, few scattered varicosities, leg pain, sleep disturbances due to breathing, tachycardia, light-headedness, exercise intolerance and edema.   Gastrointestinal:  Positive for abdominal pain, constipation and bloating. Negative for heartburn, nausea, vomiting, diarrhea, blood in stool, melena and rectal pain.   Genitourinary:  Negative for bladder incontinence, dysuria, urgency, hematuria, flank pain, difficulty urinating, nocturia, voiding less frequently, scrotal pain, ulcerations, penile discharge, male genitourinary complaint and reduced libido.   Musculoskeletal:  Negative for myalgias, back pain, joint swelling, arthralgias, stiffness, muscle cramps, neck pain, bone pain, muscle weakness and fracture.   Skin:  Negative for nail changes, itching, poor wound healing, rash, hair changes, skin changes, acne, warts, poor wound healing, scarring, flaky skin, Raynaud's phenomenon, sensitivity to sunlight and skin thickening.   Neurological:  Negative for dizziness, tingling, tremors, speech change, seizures, loss of  consciousness, weakness, light-headedness, numbness, headaches, disturbances in coordination, extremity numbness, memory loss, difficulty walking and paralysis.   Endo/Heme:  Negative for anemia, swollen glands and bruises/bleeds easily.   Psychiatric/Behavioral:  Negative for depression, hallucinations, memory loss, decreased concentration, mood swings and panic attacks.    Endocrine:  Negative for altered temperature regulation, polyphagia, polydipsia, unwanted hair growth and change in facial hair.      PAST MEDICAL HISTORY:  Past Medical History:   Diagnosis Date     Anxiety      GERD (gastroesophageal reflux disease)         PAST SURGICAL HISTORY:  Past Surgical History:   Procedure Laterality Date     ENT SURGERY      wisdom teeth out     HC ESOPH/GAS REFLUX TEST W NASAL IMPED >1 HR  10/20/2011    Procedure:ESOPHAGEAL IMPEDENCE FUNCTION TEST WITH 24 HOUR PH GREATER THAN 1 HOUR; Surgeon:SIMA CONCEPCION; Location: GI        MEDICATIONS:  Current Outpatient Medications   Medication     omeprazole (PRILOSEC) 20 MG DR capsule     No current facility-administered medications for this visit.         ALLERGIES:  No Known Allergies     SOCIAL HISTORY:  Social History     Socioeconomic History     Marital status:      Spouse name: None     Number of children: None     Years of education: None     Highest education level: None   Occupational History     Occupation:      Employer: [employer]   Social Needs     Financial resource strain: None     Food insecurity:     Worry: None     Inability: None     Transportation needs:     Medical: None     Non-medical: None   Tobacco Use     Smoking status: Former Smoker     Types: Cigarettes     Smokeless tobacco: Never Used   Substance and Sexual Activity     Alcohol use: Yes     Alcohol/week: 4.2 standard drinks     Types: 5 drink(s) per week     Comment: w/e     Drug use: No     Sexual activity: Yes     Partners: Female   Lifestyle     Physical  "activity:     Days per week: None     Minutes per session: None     Stress: None   Relationships     Social connections:     Talks on phone: None     Gets together: None     Attends [employer] service: None     Active member of club or organization: None     Attends meetings of clubs or organizations: None     Relationship status: None     Intimate partner violence:     Fear of current or ex partner: None     Emotionally abused: None     Physically abused: None     Forced sexual activity: None   Other Topics Concern      Service Not Asked     Blood Transfusions Not Asked     Caffeine Concern Not Asked     Occupational Exposure Not Asked     Hobby Hazards Not Asked     Sleep Concern Not Asked     Stress Concern Not Asked     Weight Concern Not Asked     Special Diet Yes     Comment: eats healthy     Back Care Not Asked     Exercise Yes     Comment: gym, bike 2-3x/wk     Bike Helmet Not Asked     Seat Belt Not Asked     Self-Exams Not Asked     Parent/sibling w/ CABG, MI or angioplasty before 65F 55M? Not Asked   Social History Narrative     None       FAMILY HISTORY:  No family history on file.     PHYSICAL EXAM:  Vital Signs: /76 (BP Location: Left arm, Patient Position: Sitting, Cuff Size: Adult Regular)   Pulse 68   Temp 98.2  F (36.8  C)   Ht 1.848 m (6' 0.75\")   Wt 83.3 kg (183 lb 9.6 oz)   SpO2 98%   BMI 24.39 kg/m    HEENT: NCAT; MMM;   Lungs: Breathing unlabored  Abdomen: normal     PHYSICAL EXAM AREA OF INTEREST:  abdomen     PERTINENT IMAGING/TESTING:  CT scan results reviewed from  and from yesterday [place] [facility]    phlegmonomous area around appendix with large appendicolith.    LABORATORY TESTING:  Wbc and CRP now normal.    ASSESSMENT:   Raghav Gudino is a 49 year old male with subacute appendicitis; symptoms now for 3 weeks; diagnosed and treated with antibiotics initially in [place] 13-14 days ago.    Overall feels better now and functioning normally.     DISCUSSION OF " RISKS:  We reviewed risks of waiting for operating now.  Favors waiting by a lot due to severe inflammation in the area.    PLAN:   Laparoscopic appendectomy in 4+ weeks.  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    Augmentin prescribed for 7 more days to be taken as needed for worsening symptoms.  Otherwise, antibiotics course is now completed.    Orders Placed This Encounter   Procedures     PAC Visit Referral (For [facility] Only)       Sincerely,     Blair Amin MD         Exam Information     Exam Date Exam Time Accession # Performing Department Results    3/4/20  2:45 PM [id number] [facility] CT    PACS Images      Show images for CT Abdomen Pelvis w Contrast   Study Result     Exam Type: CT ABDOMEN PELVIS W CONTRAST  Date and Time: 3/4/2020 2:45 PM  History: Appendicitis, known/chronic, recent course of antibiotics  Comparison: None     Procedure:   Helical  CT images were obtained with IV contrast.  Contrast dose: 112 ml of Isovue-370 contrast was injected  intravenously.     Radiation Dose (DLP): 343 mGy*cm     FINDINGS:     LOWER CHEST: 3 mm left lower lobe solid pulmonary nodule (series 3  image 6). Otherwise unremarkable.     ABDOMEN: Appendix is enlarged and demonstrates prominent mucosal  enhancement, thickening, and periappendiceal inflammatory changes.  There is a hyperdense stone in the appendix measuring 1.2 x 0.8 cm  (series 3 image 285). Adjacent to the appendiceal tip, there is a 1.5  x 1.6 cm hypodense structure with a thin soft tissue rim (series 3  image 284). Trace right lower quadrant and pelvic fluid. No free  abdominal air. Few small reactive appearing left lower quadrant lymph  nodes.      No dilated loops of bowel. Colonic diverticulosis without  diverticulitis. Mild rectal wall thickening. In the subcapsular right  hepatic lobe, there is a subcentimeter hypodensity with an internal  calcification, too small to accurately characterize (series 3  image  116). Pancreas, gallbladder, spleen, adrenal glands, kidneys, ureters,  and bladder appear within normal limits. Normal caliber aorta.     BONES AND SOFT TISSUES: No suspicious lesions.                                                                      IMPRESSION:   1. Persistent appendicitis with significant periappendiceal  inflammatory changes and a 1.2 cm appendicolith. Adjacent to the  appendix there is a 1.6 cm contained fluid structure which is  suspicious for a small phlegmon/developing abscess. No free abdominal  air.  2. 3 mm left lower lobe solid pulmonary nodule without suspicious  features. Recommend follow-up per Fleischner Society criteria  guidelines.     [Urgent Result: Acute appendicitis]     Finding was identified on 3/4/2020 2:33 PM.      Dr. Diallo was contacted by Dr. Daniele Patino at 3/4/2020 2:46 PM  and verbalized understanding of the urgent finding.      I have personally reviewed the examination and initial interpretation  and I agree with the findings.     SHERRI ODELL MD

## 2020-03-05 NOTE — PATIENT INSTRUCTIONS
"It was a pleasure meeting with you today.    Thank you for allowing us the privilege of caring for you. We hope we provided you with the excellent service you deserve.   Please let us know if there is anything else we can do for you so that we can be sure you are leaving completely satisfied with your care experience.    To ensure the quality of our services you may be receiving a patient satisfaction survey from an independent patient satisfaction monitoring company.    The greatest compliment you can give is a \"Likely to Recommend\"      You saw Dr Amin today.     Instructions per today's visit: Call Riky at [phone] for scheduling.        Please call during clinic hours Monday through Friday 8:00a - 4:00p if you have questions or you can contact us via [employer] at anytime.      General Surgery Call Center: [phone]  Fax: [phone]  Surgery Scheduler: [phone]    Please call the hospital at [phone] to speak with our on call MDs if you have urgent needs after hours, during weekends, or holidays.  It was a pleasure meeting with you today.     Thank you for allowing us the privilege of caring for you. We hope we provided you with the excellent service you deserve.     Please let us know if there is anything else we can do for you so that we can be sure you are leaving completely satisfied with your care experience.    "

## 2020-03-05 NOTE — NURSING NOTE
This patient is having laparoscopic appendectomy by Dr. Amin.    Call [phone] Riky Paul to confirm surgery date     Contact PCP for H&P within 30 days of surgery.

## 2020-03-09 ENCOUNTER — TELEPHONE (OUTPATIENT)
Dept: SURGERY | Facility: CLINIC | Age: 50
End: 2020-03-09

## 2020-03-13 DIAGNOSIS — K35.30 ACUTE APPENDICITIS WITH LOCALIZED PERITONITIS, UNSPECIFIED WHETHER ABSCESS PRESENT, UNSPECIFIED WHETHER GANGRENE PRESENT, UNSPECIFIED WHETHER PERFORATION PRESENT: ICD-10-CM

## 2020-03-13 LAB — CRP SERPL-MCNC: <2.9 MG/L (ref 0–8)

## 2020-03-17 ENCOUNTER — OFFICE VISIT (OUTPATIENT)
Dept: INTERNAL MEDICINE | Facility: CLINIC | Age: 50
End: 2020-03-17
Payer: COMMERCIAL

## 2020-03-17 VITALS
DIASTOLIC BLOOD PRESSURE: 73 MMHG | HEART RATE: 72 BPM | SYSTOLIC BLOOD PRESSURE: 109 MMHG | BODY MASS INDEX: 24.18 KG/M2 | OXYGEN SATURATION: 97 % | WEIGHT: 182 LBS

## 2020-03-17 DIAGNOSIS — R74.01 ELEVATED ALANINE AMINOTRANSFERASE (ALT) LEVEL: Primary | ICD-10-CM

## 2020-03-17 ASSESSMENT — PAIN SCALES - GENERAL: PAINLEVEL: NO PAIN (0)

## 2020-03-17 NOTE — Clinical Note
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  I did his preop today.  Thanks.  Kings Diallo

## 2020-03-17 NOTE — PATIENT INSTRUCTIONS
[facility] Medication Refill Request Information:  * Please contact your pharmacy regarding ANY request for medication refills.  ** [facility] Prescription Fax = [phone]  * Please allow 3 business days for routine medication refills.  * Please allow 5 business days for controlled substance medication refills.     [facility] Test Result notification information:  *You will be notified with in 7-10 days of your appointment day regarding the results of your test.  If you are on MyChart you will be notified as soon as the provider has reviewed the results and signed off on them.    [facility]: [phone] \

## 2020-03-17 NOTE — NURSING NOTE
Chief Complaint   Patient presents with     Pre-Op Exam     pt here for preop       Yany Harmon CMA, EMT at 2:04 PM on 3/17/2020.

## 2020-03-17 NOTE — PROGRESS NOTES
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  On February 21 he was diagnosed while in [facility] with acute appendicitis.  It was elected at that time to treat him with a course of antibiotics and delay surgery for 6 weeks.  He is treated in the hospital for 4 days with IV antibiotics and discharged on Augmentin for an additional week.  He subsequently returned home here and CT scan done on March 3 showed evidence of persistent appendicitis with a 1.2 cm appendicolith and adjacent phlegmon or possible abscess.  Is seen in consultation by Dr. Amin in general surgery and tentatively scheduled for surgery on April 15.  He is not having any additional fever abdominal pain anorexia nausea or vomiting he is anxious to have the appendix removed and is hoping to have surgery sooner.  He has had general anesthesia in the past for his wisdom teeth but otherwise no anesthetic exposures or previous surgery.  He exercises regularly tolerates this well without chest pain dyspnea dizziness or exercise intolerance.  He has had no family history or personal history of bleeding or anesthetic reactions.  He is presently on omeprazole and no other medications.  Past Medical History:   Diagnosis Date     Anxiety      Appendicitis 03/2020     GERD (gastroesophageal reflux disease)      Past Surgical History:   Procedure Laterality Date     ENT SURGERY      wisdom teeth out     HC ESOPH/GAS REFLUX TEST W NASAL IMPED >1 HR  10/20/2011    Procedure:ESOPHAGEAL IMPEDENCE FUNCTION TEST WITH 24 HOUR PH GREATER THAN 1 HOUR; Surgeon:SIMA CONCEPCION; Location:[facility]     No family history on file.  Social History     Socioeconomic History     Marital status:      Spouse name: Not on file     Number of children: Not on file     Years of education: Not on file     Highest education level: Not on file   Occupational History     Occupation:      Employer:  [employer]   Social Needs     Financial resource strain: Not on file     Food insecurity     Worry: Not on file     Inability: Not on file     Transportation needs     Medical: Not on file     Non-medical: Not on file   Tobacco Use     Smoking status: Former Smoker     Types: Cigarettes     Smokeless tobacco: Never Used   Substance and Sexual Activity     Alcohol use: Yes     Alcohol/week: 4.2 standard drinks     Types: 5 drink(s) per week     Comment: w/e     Drug use: No     Sexual activity: Yes     Partners: Female   Lifestyle     Physical activity     Days per week: Not on file     Minutes per session: Not on file     Stress: Not on file   Relationships     Social connections     Talks on phone: Not on file     Gets together: Not on file     Attends Rastafarian service: Not on file     Active member of club or organization: Not on file     Attends meetings of clubs or organizations: Not on file     Relationship status: Not on file     Intimate partner violence     Fear of current or ex partner: Not on file     Emotionally abused: Not on file     Physically abused: Not on file     Forced sexual activity: Not on file   Other Topics Concern      Service Not Asked     Blood Transfusions Not Asked     Caffeine Concern Not Asked     Occupational Exposure Not Asked     Hobby Hazards Not Asked     Sleep Concern Not Asked     Stress Concern Not Asked     Weight Concern Not Asked     Special Diet Yes     Comment: eats healthy     Back Care Not Asked     Exercise Yes     Comment: gym, bike 2-3x/wk     Bike Helmet Not Asked     Seat Belt Not Asked     Self-Exams Not Asked     Parent/sibling w/ CABG, MI or angioplasty before 65F 55M? Not Asked   Social History Narrative     Not on file     Complete review of symptoms negative except as noted above.    Exam:  /73 (BP Location: Right arm, Patient Position: Sitting, Cuff Size: Adult Large)   Pulse 72   Wt 82.6 kg (182 lb)   SpO2 97%   BMI 24.18 kg/m    182 lbs 0  oz  Physical Exam   The patient is alert, oriented with a clear sensorium.   Skin shows no lesions or rashes and good turgor.   Head is normocephalic and atraumatic.   Eyes show PERRLA with benign optic fundi.   Ears show normal TMs bilaterally.   Mouth shows clear oral mucosa.   Neck shows no nodes, thyromegaly or bruits.   Back is non tender.  Lungs are clear to percussion and auscultation.   Heart shows normal S1 and S2 without murmur or gallop.   Abdomen is soft and nontender without masses or organomegaly.   Extremities show no edema and no evidence of active synovitis.   Neurologic examination shows cranial nerves II-XII intact. Motor shows 5/5 strength. Reflexes are symmetric. Cerebellar testing shows normal tandem gait.  Romberg negative.    Labs reviewed with him:  Results for RADHA NATION (MRN [id number]) as of 3/17/2020 15:52   Ref. Range 3/4/2020 15:03   Sodium Latest Ref Range: 133 - 144 mmol/L 137   Potassium Latest Ref Range: 3.4 - 5.3 mmol/L 4.9   Chloride Latest Ref Range: 94 - 109 mmol/L 105   Carbon Dioxide Latest Ref Range: 20 - 32 mmol/L 30   Urea Nitrogen Latest Ref Range: 7 - 30 mg/dL 15   Creatinine Latest Ref Range: 0.66 - 1.25 mg/dL 0.99   GFR Estimate Latest Ref Range: >60 mL/min/1.73_m2 88   GFR Estimate If Black Latest Ref Range: >60 mL/min/1.73_m2 >90   Calcium Latest Ref Range: 8.5 - 10.1 mg/dL 9.2   Anion Gap Latest Ref Range: 3 - 14 mmol/L 2 (L)   Albumin Latest Ref Range: 3.4 - 5.0 g/dL 3.7   Protein Total Latest Ref Range: 6.8 - 8.8 g/dL 7.3   Bilirubin Total Latest Ref Range: 0.2 - 1.3 mg/dL 0.3   Alkaline Phosphatase Latest Ref Range: 40 - 150 U/L 93   ALT Latest Ref Range: 0 - 70 U/L 99 (H)   AST Latest Ref Range: 0 - 45 U/L 38   CRP Inflammation Latest Ref Range: 0.0 - 8.0 mg/L 3.5   Glucose Latest Ref Range: 70 - 99 mg/dL 101 (H)   WBC Latest Ref Range: 4.0 - 11.0 10e9/L 6.7   Hemoglobin Latest Ref Range: 13.3 - 17.7 g/dL 15.2   Hematocrit Latest Ref Range: 40.0 - 53.0 %  46.5   Platelet Count Latest Ref Range: 150 - 450 10e9/L 331   RBC Count Latest Ref Range: 4.4 - 5.9 10e12/L 4.87   MCV Latest Ref Range: 78 - 100 fl 96   MCH Latest Ref Range: 26.5 - 33.0 pg 31.2   MCHC Latest Ref Range: 31.5 - 36.5 g/dL 32.7   RDW Latest Ref Range: 10.0 - 15.0 % 11.9   Sed Rate Latest Ref Range: 0 - 20 mm/h 6       ASSESSMENT  1 4 weeks status post acute appendicitis now quiesced sent after antibiotic therapy  2 GERD on omeprazole  3 mild ALT elevation likely related to #1 above    Plan  Patient is stable from a medical and a cardiovascular standpoint and does not require any additional imaging or investigation prior to the planned low risk surgery.  He would be low risk for cardiovascular complications for this.  We will plan to recheck his liver function studies.    This note was completed using Dragon voice recognition software.  Although reviewed after completion, some word and grammatical errors may occur.    Ebenezer Diallo MD  General Internal Medicine  [facility]  [phone]

## 2020-05-08 ENCOUNTER — TELEPHONE (OUTPATIENT)
Dept: SURGERY | Facility: CLINIC | Age: 50
End: 2020-05-08

## 2020-05-08 ENCOUNTER — HOSPITAL ENCOUNTER (OUTPATIENT)
Facility: CLINIC | Age: 50
End: 2020-05-08
Attending: SURGERY | Admitting: SURGERY
Payer: COMMERCIAL

## 2020-05-08 DIAGNOSIS — K35.30 ACUTE APPENDICITIS WITH LOCALIZED PERITONITIS, UNSPECIFIED WHETHER ABSCESS PRESENT, UNSPECIFIED WHETHER GANGRENE PRESENT, UNSPECIFIED WHETHER PERFORATION PRESENT: ICD-10-CM

## 2020-05-08 DIAGNOSIS — Z11.59 ENCOUNTER FOR SCREENING FOR OTHER VIRAL DISEASES: Primary | ICD-10-CM

## 2020-05-08 NOTE — TELEPHONE ENCOUNTER
FUTURE VISIT INFORMATION      SURGERY INFORMATION:    Date: 5/15/20    Location: [facility] OR    Surgeon:  Blair Amin MD    Anesthesia Type:  General    Procedure: APPENDECTOMY, LAPAROSCOPIC     Consult: OV 3/5    RECORDS REQUESTED FROM:       Primary Care Provider: Ebenezer Diallo MD- [facility]    Most recent EKG+ Tracin19

## 2020-05-08 NOTE — TELEPHONE ENCOUNTER
Called patient to discuss OR date for laparoscopic appendectomy. Scheduled [facility] 5/15/20 at 7:30 a.m., to arrive at 5:30 a.m.  Inquired if patient has had Covid testing and he has not. Explained someone will be calling him to get this done at a drive-up facility.   Will schedule PAC, someone from PAC will review to see if he needs to come to clinic or if this can be done virtually.

## 2020-05-12 ENCOUNTER — TELEPHONE (OUTPATIENT)
Dept: SURGERY | Facility: CLINIC | Age: 50
End: 2020-05-12

## 2020-05-12 ENCOUNTER — PRE VISIT (OUTPATIENT)
Dept: SURGERY | Facility: CLINIC | Age: 50
End: 2020-05-12

## 2020-05-12 ENCOUNTER — OFFICE VISIT (OUTPATIENT)
Dept: URGENT CARE | Facility: URGENT CARE | Age: 50
End: 2020-05-12
Attending: SURGERY
Payer: COMMERCIAL

## 2020-05-12 ENCOUNTER — ANESTHESIA EVENT (OUTPATIENT)
Dept: SURGERY | Facility: CLINIC | Age: 50
End: 2020-05-12
Payer: COMMERCIAL

## 2020-05-12 ENCOUNTER — VIRTUAL VISIT (OUTPATIENT)
Dept: SURGERY | Facility: CLINIC | Age: 50
End: 2020-05-12
Attending: SURGERY
Payer: COMMERCIAL

## 2020-05-12 VITALS — TEMPERATURE: 97.9 F | BODY MASS INDEX: 23.92 KG/M2 | WEIGHT: 180.5 LBS | HEIGHT: 73 IN

## 2020-05-12 DIAGNOSIS — K35.30 ACUTE APPENDICITIS WITH LOCALIZED PERITONITIS, UNSPECIFIED WHETHER ABSCESS PRESENT, UNSPECIFIED WHETHER GANGRENE PRESENT, UNSPECIFIED WHETHER PERFORATION PRESENT: Primary | ICD-10-CM

## 2020-05-12 DIAGNOSIS — Z01.818 PRE-OP EVALUATION: Primary | ICD-10-CM

## 2020-05-12 DIAGNOSIS — Z11.59 ENCOUNTER FOR SCREENING FOR OTHER VIRAL DISEASES: ICD-10-CM

## 2020-05-12 DIAGNOSIS — K37 APPENDICITIS, UNSPECIFIED APPENDICITIS TYPE: ICD-10-CM

## 2020-05-12 PROCEDURE — 99207 ZZC NO BILLABLE SERVICE THIS VISIT: CPT

## 2020-05-12 PROCEDURE — 99000 SPECIMEN HANDLING OFFICE-LAB: CPT | Performed by: SURGERY

## 2020-05-12 PROCEDURE — U0003 INFECTIOUS AGENT DETECTION BY NUCLEIC ACID (DNA OR RNA); SEVERE ACUTE RESPIRATORY SYNDROME CORONAVIRUS 2 (SARS-COV-2) (CORONAVIRUS DISEASE [COVID-19]), AMPLIFIED PROBE TECHNIQUE, MAKING USE OF HIGH THROUGHPUT TECHNOLOGIES AS DESCRIBED BY CMS-2020-01-R: HCPCS | Mod: 90 | Performed by: SURGERY

## 2020-05-12 ASSESSMENT — PAIN SCALES - GENERAL: PAINLEVEL: MILD PAIN (2)

## 2020-05-12 ASSESSMENT — MIFFLIN-ST. JEOR: SCORE: 1727.62

## 2020-05-12 ASSESSMENT — LIFESTYLE VARIABLES: TOBACCO_USE: 0

## 2020-05-12 NOTE — H&P
Pre-Operative H & P         Video-Visit Details    Type of service:  Video Visit    Patient verbally consented to video service today: YES      Video Start Time: 0948  Video End Time (time video stopped): 1000    Originating Location (pt. Location): Home    Distant Location (provider location):  [facility]OPER[facility]     Mode of Communication:  Video Conference via [employer]       **Physical exam and vital signs not completed today as this visit was scheduled as a virtual visit during Covid 19 pandemic. Physical exam should be completed the DOS in pre-op**       CC:  Preoperative exam to assess for increased cardiopulmonary risk while undergoing surgery and anesthesia.    Date of Encounter: 5/12/2020  Primary Care Physician:  Ebenezer Diallo  Associated diagnosis: appendicitis    HPI  Raghav Gudino is a 49 year old male who presents for pre-operative H & P in preparation for APPENDECTOMY, LAPAROSCOPIC with Dr. Amin on 5/15/20 at [facility]. Patient is being evaluated for comorbid conditions of GERD, anxiety     Mr. Gudino was diagnosed with appendicitis 2/21/20 while vacationing in [place]. He was treated with antibiotics with recommendation to undergo surgery 6 weeks later. March 3 he followed up back home and CT showed evidence of persistent appendicitis. He was scheduled for surgery in April, but surgery was delayed due to the COVID pandemic.  He completed an H&P at that time.  He is being seen today via video visit to update H&P.    History is obtained from the patient and chart review.      Past Medical History  Past Medical History:   Diagnosis Date     Anxiety      Appendicitis 03/2020     GERD (gastroesophageal reflux disease)        Past Surgical History  Past Surgical History:   Procedure Laterality Date     ENT SURGERY      wisdom teeth out     HC ESOPH/GAS REFLUX TEST W NASAL IMPED >1 HR  10/20/2011    Procedure:ESOPHAGEAL  IMPEDENCE FUNCTION TEST WITH 24 HOUR PH GREATER THAN 1 HOUR; Surgeon:SIMA CONCEPCION; Location: GI       Hx of Blood transfusions/reactions: denies     Hx of abnormal bleeding or anti-platelet use: denies    Steroid use in the last year: denies    Personal or FH with difficulty with Anesthesia:  denies    Prior to Admission Medications  Current Outpatient Medications   Medication Sig Dispense Refill     omeprazole (PRILOSEC) 20 MG DR capsule Take 1 capsule (20 mg) by mouth daily (Patient taking differently: Take 20 mg by mouth every morning ) 100 capsule 3       Allergies  No Known Allergies    Social History  Social History     Socioeconomic History     Marital status:      Spouse name: Not on file     Number of children: Not on file     Years of education: Not on file     Highest education level: Not on file   Occupational History     Occupation:      Employer: [employer]   Social Needs     Financial resource strain: Not on file     Food insecurity     Worry: Not on file     Inability: Not on file     Transportation needs     Medical: Not on file     Non-medical: Not on file   Tobacco Use     Smoking status: Former Smoker     Types: Cigarettes     Smokeless tobacco: Never Used   Substance and Sexual Activity     Alcohol use: Yes     Alcohol/week: 4.2 standard drinks     Types: 5 drink(s) per week     Comment: w/e     Drug use: No     Sexual activity: Yes     Partners: Female   Lifestyle     Physical activity     Days per week: Not on file     Minutes per session: Not on file     Stress: Not on file   Relationships     Social connections     Talks on phone: Not on file     Gets together: Not on file     Attends Scientology service: Not on file     Active member of club or organization: Not on file     Attends meetings of clubs or organizations: Not on file     Relationship status: Not on file     Intimate partner violence     Fear of current or ex partner: Not on file     Emotionally  "abused: Not on file     Physically abused: Not on file     Forced sexual activity: Not on file   Other Topics Concern      Service Not Asked     Blood Transfusions Not Asked     Caffeine Concern Not Asked     Occupational Exposure Not Asked     Hobby Hazards Not Asked     Sleep Concern Not Asked     Stress Concern Not Asked     Weight Concern Not Asked     Special Diet Yes     Comment: eats healthy     Back Care Not Asked     Exercise Yes     Comment: gym, bike 2-3x/wk     Bike Helmet Not Asked     Seat Belt Not Asked     Self-Exams Not Asked     Parent/sibling w/ CABG, MI or angioplasty before 65F 55M? Not Asked   Social History Narrative     Not on file       Family History  Family History   Problem Relation Age of Onset     Anesthesia Reaction No family hx of      Deep Vein Thrombosis (DVT) No family hx of          ROS/MED HX    ENT/Pulmonary:  - neg pulmonary ROS    (-) tobacco use   Neurologic:  - neg neurologic ROS     Cardiovascular:  - neg cardiovascular ROS   (+) ----. : . . . :. . Previous cardiac testing date:results:date: results:ECG reviewed date:8/2019 results:SR, normal date: results:         (-) taking anticoagulants/antiplatelets   METS/Exercise Tolerance:  >4   Hematologic:  - neg hematologic  ROS      (-) History of Transfusion   Musculoskeletal:  - neg musculoskeletal ROS       GI/Hepatic:     (+) GERD appendicitis,       Renal/Genitourinary:  - ROS Renal section negative       Endo:  - neg endo ROS       Psychiatric:     (+) psychiatric history anxiety      Infectious Disease:  - neg infectious disease ROS       Malignancy:      - no malignancy   Other:             The complete review of systems is negative other than noted in the HPI or here.   Temp: 97.9  F (36.6  C) Temp src: Oral                   180 lbs 8 oz  6' 1\"   Body mass index is 23.81 kg/m .       Physical Exam  Constitutional: Awake, alert, cooperative, no apparent distress, and appears stated age.  Eyes: Extra ocular " muscles intact, sclera clear, conjunctiva normal.  HENT: Normocephalic  Neuropsychiatric: Calm, cooperative. Normal affect.     Please refer to the physical examination documented by the anesthesiologist in the anesthesia record on the day of surgery    Labs: (personally reviewed)  Component      Latest Ref Rng & Units 3/4/2020   Sodium      133 - 144 mmol/L 137   Potassium      3.4 - 5.3 mmol/L 4.9   Chloride      94 - 109 mmol/L 105   Carbon Dioxide      20 - 32 mmol/L 30   Anion Gap      3 - 14 mmol/L 2 (L)   Glucose      70 - 99 mg/dL 101 (H)   Urea Nitrogen      7 - 30 mg/dL 15   Creatinine      0.66 - 1.25 mg/dL 0.99   GFR Estimate      >60 mL/min/1.73:m2 88   GFR Estimate If Black      >60 mL/min/1.73:m2 >90   Calcium      8.5 - 10.1 mg/dL 9.2   Bilirubin Total      0.2 - 1.3 mg/dL 0.3   Albumin      3.4 - 5.0 g/dL 3.7   Protein Total      6.8 - 8.8 g/dL 7.3   Alkaline Phosphatase      40 - 150 U/L 93   ALT      0 - 70 U/L 99 (H)   AST      0 - 45 U/L 38   WBC      4.0 - 11.0 10e9/L 6.7   RBC Count      4.4 - 5.9 10e12/L 4.87   Hemoglobin      13.3 - 17.7 g/dL 15.2   Hematocrit      40.0 - 53.0 % 46.5   MCV      78 - 100 fl 96   MCH      26.5 - 33.0 pg 31.2   MCHC      31.5 - 36.5 g/dL 32.7   RDW      10.0 - 15.0 % 11.9   Platelet Count      150 - 450 10e9/L 331       EKG 8/2019  SR, normal EKG      ASSESSMENT and PLAN  Raghav Gudino is a 49 year old male scheduled for APPENDECTOMY, LAPAROSCOPIC on 5/15/20 by Dr. Amin in treatment of appendicitis.  PAC referral for risk assessment and optimization for anesthesia with comorbid conditions of GERD, anxiety:    Pre-operative considerations:  1.  Cardiac:  Functional status- METS >4. No reported cardiac history.  intermediate risk surgery with 0.9% (RCRI #) risk of major adverse cardiac event.   2.  Pulm:  Airway feasible.  RAVEN risk: low.  No pulmonary history reported.  denies pulmonary symptoms. COVID test scheduled for this afternoon.  3.  GI:  Risk of PONV  score = 3.  If > 2, anti-emetic intervention recommended.  GERD well controlled on omeprazole (continue). Appendicitis- antibiotics complete.  Above procedure planned. OF note, patient requests repeat CT prior to surgery-  I recommended he reach out to surgery team to help with scheduling.     VTE risk: 0.5%    Patient is optimized and is acceptable candidate for the proposed procedure.  No further diagnostic evaluation is needed.     Nelsy Rothman PA-C  Preoperative Assessment Center  [facility]  Clinic and Surgery Center  Phone: [phone]  Fax: [phone]

## 2020-05-12 NOTE — ANESTHESIA PREPROCEDURE EVALUATION
"Anesthesia Pre-Procedure Evaluation    Patient: Raghav Gudino   MRN:     [id number] Gender:   male   Age:    49 year old :      1970        Preoperative Diagnosis: Acute appendicitis with localized peritonitis, unspecified whether abscess present, unspecified whether gangrene present, unspecified whether perforation present [K35.30]   Procedure(s):  APPENDECTOMY, LAPAROSCOPIC     LABS:  CBC:   Lab Results   Component Value Date    WBC 6.7 2020    WBC 3.7 (L) 2019    HGB 15.2 2020    HGB 14.9 2019    HCT 46.5 2020    HCT 44.4 2019     2020     2019     BMP:   Lab Results   Component Value Date     2020     2019    POTASSIUM 4.9 2020    POTASSIUM 4.2 2019    CHLORIDE 105 2020    CHLORIDE 105 2019    CO2 30 2020    CO2 28 2019    BUN 15 2020    BUN 16 2019    CR 0.99 2020    CR 1.06 2019     (H) 2020    GLC 97 2019     COAGS: No results found for: PTT, INR, FIBR  POC: No results found for: BGM, HCG, HCGS  OTHER:   Lab Results   Component Value Date    A1C 5.1 10/26/2011    AIDAN 9.2 2020    MAG 2.1 2011    ALBUMIN 3.7 2020    PROTTOTAL 7.3 2020    ALT 99 (H) 2020    AST 38 2020    ALKPHOS 93 2020    BILITOTAL 0.3 2020    TSH 1.26 10/26/2011    T4 1.28 2010    CRP <2.9 2020    SED 6 2020        Preop Vitals    BP Readings from Last 3 Encounters:   20 109/73   20 125/76   19 118/79    Pulse Readings from Last 3 Encounters:   20 72   20 68   19 66      Resp Readings from Last 3 Encounters:   10/19/16 20   16 16   09/03/15 18    SpO2 Readings from Last 3 Encounters:   20 97%   20 98%   09/03/15 97%      Temp Readings from Last 1 Encounters:   20 98.2  F (36.8  C)    Ht Readings from Last 1 Encounters:   20 1.848 m (6' 0.75\") " "     Wt Readings from Last 1 Encounters:   03/17/20 82.6 kg (182 lb)    Estimated body mass index is 24.18 kg/m  as calculated from the following:    Height as of 3/5/20: 1.848 m (6' 0.75\").    Weight as of 3/17/20: 82.6 kg (182 lb).     LDA:        Past Medical History:   Diagnosis Date     Anxiety      Appendicitis 03/2020     GERD (gastroesophageal reflux disease)       Past Surgical History:   Procedure Laterality Date     ENT SURGERY      wisdom teeth out     HC ESOPH/GAS REFLUX TEST W NASAL IMPED >1 HR  10/20/2011    Procedure:ESOPHAGEAL IMPEDENCE FUNCTION TEST WITH 24 HOUR PH GREATER THAN 1 HOUR; Surgeon:SIMA CONCEPCION; Location:[facility]      No Known Allergies     Anesthesia Evaluation     . Pt has had prior anesthetic. Type: General    No history of anesthetic complications          ROS/MED HX    ENT/Pulmonary:  - neg pulmonary ROS    (-) tobacco use   Neurologic:  - neg neurologic ROS     Cardiovascular:  - neg cardiovascular ROS   (+) ----. : . . . :. . Previous cardiac testing date:results:date: results:ECG reviewed date:8/2019 results:SR, normal date: results:         (-) taking anticoagulants/antiplatelets   METS/Exercise Tolerance:  >4 METS   Hematologic:  - neg hematologic  ROS      (-) History of Transfusion   Musculoskeletal:  - neg musculoskeletal ROS       GI/Hepatic: Comment: Hx of gastritits    (+) GERD Asymptomatic on medication, appendicitis,       Renal/Genitourinary:  - ROS Renal section negative       Endo:  - neg endo ROS       Psychiatric:         Infectious Disease:  - neg infectious disease ROS       Malignancy:      - no malignancy   Other:    (+) No chance of pregnancy no H/O Chronic Pain,                       PHYSICAL EXAM:   Mental Status/Neuro: A/A/O   Airway: Facies: Feasible  Mallampati: I  Mouth/Opening: Full  TM distance: > 6 cm  Neck ROM: Full   Respiratory: Auscultation: CTAB     Resp. Rate: Normal     Resp. Effort: Normal      CV: Rhythm: Regular  Rate: Age " appropriate  Heart: Normal Sounds  Edema: None   Comments:      Dental: Details                  Assessment:   ASA SCORE: 2    H&P: History and physical reviewed and following examination; no interval change.   Smoking Status:  Non-Smoker/Unknown   NPO Status: NPO Appropriate     Plan:   Anes. Type:  General   Pre-Medication: Midazolam; Acetaminophen   Induction:  IV (Standard)   Airway: ETT; Oral   Access/Monitoring: PIV   Maintenance: Balanced     Postop Plan:   Postop Pain: Opioids  Postop Sedation/Airway: Not planned  Disposition: Outpatient     PONV Management:   Adult Risk Factors:, Non-Smoker, Postop Opioids   Prevention: Ondansetron, Dexamethasone     CONSENT: Direct conversation   Plan and risks discussed with: Patient   Blood Products: Consent Deferred (Minimal Blood Loss)       Comments for Plan/Consent:  Discussed common and potentially harmful risks for General Anesthesia.   These risks include, but were not limited to: Sore throat, Airway injury, Dental injury, Aspiration, Respiratory issues (Bronchospasm, Laryngospasm, Desaturation), Hemodynamic issues (Arrhythmia, Hypotension, Ischemia), Potential long term consequences of respiratory and hemodynamic issues, PONV, Emergence delirium  Risks of invasive procedures were not discussed: N/A    All questions were answered.              PAC Discussion and Assessment    ASA Classification: 2  Case is suitable for: [facility]  Anesthetic techniques and relevant risks discussed: GA  Invasive monitoring and risk discussed:   Types:   Possibility and Risk of blood transfusion discussed:   NPO instructions given:   Additional anesthetic preparation and risks discussed:   Needs early admission to pre-op area:   Other:     PAC Resident/NP Anesthesia Assessment:  Raghav Gudino is a 49 year old male scheduled for APPENDECTOMY, LAPAROSCOPIC on 5/15/20 by Dr. Amin in treatment of appendicitis.  PAC referral for risk assessment and optimization for anesthesia with  comorbid conditions of GERD, anxiety:    Pre-operative considerations:  1.  Cardiac:  Functional status- METS >4. No reported cardiac history.  intermediate risk surgery with 0.9% (RCRI #) risk of major adverse cardiac event.   2.  Pulm:  Airway feasible.  RAVEN risk: low.  No pulmonary history reported.  denies pulmonary symptoms. COVID test scheduled for this afternoon.  3.  GI:  Risk of PONV score = 3.  If > 2, anti-emetic intervention recommended.  GERD well controlled on omeprazole (continue). Appendicitis- antibiotics complete.  Above procedure planned. OF note, patient requests repeat CT prior to surgery-  I recommended he reach out to surgery team to help with scheduling.     VTE risk: 0.5%    Patient is optimized and is acceptable candidate for the proposed procedure.  No further diagnostic evaluation is needed.     **For further details of assessment, testing, and physical exam please see H and P completed on same date.      Nelsy Rothman PA-C        Mid-Level Provider/Resident:   Date:   Time:     Attending Anesthesiologist Anesthesia Assessment:        Anesthesiologist:   Date:   Time:   Pass/Fail:   Disposition:     PAC Pharmacist Assessment:        Pharmacist:   Date:   Time:    Nelsy Rothman PA-C

## 2020-05-12 NOTE — TELEPHONE ENCOUNTER
"Patient left VM message stating he has an appendectomy scheduled with Dr. Amin on Friday, 5/15 at the [facility].   He is requesting a CT scan be done prior to the surgery, wants \"him to look inside before cutting me open.\"   I will pass this information on to the clinic RN and she can discuss this request with Dr. Amin.   "

## 2020-05-12 NOTE — PATIENT INSTRUCTIONS
Preparing for Your Surgery      Name:  Raghav Gudino   MRN:  [id number]   :  1970   Today's Date:  2020     Arriving for surgery:  Surgery date:  5/15/20  Arrival time:  5:30 am    Due to the COVID 19 crisis, we are trying to keep our patients safe from others who might have respiratory illnesses so the hospital is implementing a no visitor policy.    Please come to:     [facility]   [address] [place]  [address]     -    Please proceed to the Surgery Lounge on the 3rd floor. [phone]?     - ?If you are in need of directions, wheelchair or escort please stop at the Information Desk in the lobby.  Inform the information person that you are here for surgery; a wheelchair and escort will be provided to the Surgery Lounge .?     What can I eat or drink?  -  You may have solid food or milk products until 8 hours prior to your surgery. (Until 11:30 pm 20)  -  You may have water, apple juice or 7up/Sprite until 2 hours prior to your surgery. (Until 5:30 am)    Which medicines can I take?      -  Hold Aspirin, Aspirin products, Multivitamins, and Supplements one week prior to surgery.        -  Follow Surgery Clinic instructions regarding Ibuprofen. If you weren't given any  Instructions, hold Ibuprofen for 24 hours prior to surgery.        -  Hold Naproxen for 48 hours prior to surgery.     -  Please take these medications the day of surgery:  Omeprazole  Acetaminophen (Tylenol) if needed    How do I prepare myself?  -  Take two showers: one the night before surgery; and one the morning of surgery.         Use Scrubcare or Hibiclens to wash from neck down, leave soap on your skin for up to one minute.  Do not get soap in your eyes or ears.  You may use your own shampoo and conditioner; no other hair products.   -  Do NOT use lotion, powder, deodorant, or antiperspirant the day of your surgery.  -  Do NOT wear any  jewelry.  - Do not bring your own  medications to the hospital.  -  Bring your ID and insurance card.    -If you are scheduled to go home the Same Day as surgery you must have a responsible adult as a  and to stay with you overnight the first 24 hours after surgery.     Questions or Concerns:  -If you are scheduled on the [facility] or [facility] campus and have questions or concerns regarding the day of surgery, please call Preadmission Nursing at [phone].     -If you have health changes between today and your surgery please call your surgeon. For questions after surgery please call your surgeons office.     AFTER YOUR SURGERY  Breathing exercises   Breathing exercises help you recover faster. Take deep breaths and let the air out slowly. This will:     Help you wake up after surgery.    Help prevent complications like pneumonia.  Preventing complications will help you go home sooner.   We may give you a breathing device (incentive spirometer) to encourage you to breathe deeply.   Nausea and vomiting   You may feel sick to your stomach after surgery; if so, let your nurse know.    Pain control:  After surgery, you may have pain. Our goal is to help you manage your pain. Pain medicine will help you feel comfortable enough to do activities that will help you heal.  These activities may include breathing exercises, walking and physical therapy.   To help your health care team treat your pain we will ask: 1) If you have pain  2) where it is located 3) describe your pain in your words  Methods of pain control include medications given by mouth, vein or by nerve block for some surgeries.  Sequential Compression Device (SCD):  You may need to wear SCD S (also called pneumo boots)on your legs or feet. These are wraps connected to a machine that pumps in air and releases it. The repeated pumping helps prevent blood clots from forming.

## 2020-05-12 NOTE — PROGRESS NOTES
"Raghav Gudino is a 49 year old male who is being evaluated via a billable video visit.      The patient has been notified of following:     \"This video visit will be conducted via a call between you and your physician/provider. We have found that certain health care needs can be provided without the need for an in-person physical exam.  This service lets us provide the care you need with a video conversation.  If a prescription is necessary we can send it directly to your pharmacy.  If lab work is needed we can place an order for that and you can then stop by our lab to have the test done at a later time.    Video visits are billed at different rates depending on your insurance coverage.  Please reach out to your insurance provider with any questions.    If during the course of the call the physician/provider feels a video visit is not appropriate, you will not be charged for this service.\"    Patient has given verbal consent for Video visit? Yes    How would you like to obtain your AVS? E-Mail (inform patient AVS not encrypted)    Patient would like the video invitation sent by: Send to e-mail at: [email or web address]    Will anyone else be joining your video visit? No      Gwyn Bello      "

## 2020-05-13 ENCOUNTER — ANCILLARY PROCEDURE (OUTPATIENT)
Dept: CT IMAGING | Facility: CLINIC | Age: 50
End: 2020-05-13
Attending: INTERNAL MEDICINE
Payer: COMMERCIAL

## 2020-05-13 ENCOUNTER — TELEPHONE (OUTPATIENT)
Dept: SURGERY | Facility: CLINIC | Age: 50
End: 2020-05-13

## 2020-05-13 DIAGNOSIS — K35.30 ACUTE APPENDICITIS WITH LOCALIZED PERITONITIS, UNSPECIFIED WHETHER ABSCESS PRESENT, UNSPECIFIED WHETHER GANGRENE PRESENT, UNSPECIFIED WHETHER PERFORATION PRESENT: ICD-10-CM

## 2020-05-13 DIAGNOSIS — R74.01 ELEVATED ALANINE AMINOTRANSFERASE (ALT) LEVEL: ICD-10-CM

## 2020-05-13 LAB
ALBUMIN SERPL-MCNC: 3.7 G/DL (ref 3.4–5)
ALP SERPL-CCNC: 68 U/L (ref 40–150)
ALT SERPL W P-5'-P-CCNC: 36 U/L (ref 0–70)
AST SERPL W P-5'-P-CCNC: 20 U/L (ref 0–45)
BILIRUB DIRECT SERPL-MCNC: 0.2 MG/DL (ref 0–0.2)
BILIRUB SERPL-MCNC: 0.7 MG/DL (ref 0.2–1.3)
CRP SERPL-MCNC: <2.9 MG/L (ref 0–8)
PROT SERPL-MCNC: 6.9 G/DL (ref 6.8–8.8)
SARS-COV-2 RNA SPEC QL NAA+PROBE: NOT DETECTED
SPECIMEN SOURCE: NORMAL

## 2020-05-13 RX ORDER — IOPAMIDOL 755 MG/ML
111 INJECTION, SOLUTION INTRAVASCULAR ONCE
Status: COMPLETED | OUTPATIENT
Start: 2020-05-13 | End: 2020-05-13

## 2020-05-13 RX ADMIN — IOPAMIDOL 111 ML: 755 INJECTION, SOLUTION INTRAVASCULAR at 10:51

## 2020-05-13 NOTE — TELEPHONE ENCOUNTER
Received a call from Bridget Farris, to see if the case could be moved from East to West on 5/15. Still would be first case.  I called the patient to let him know of the campus change and also gave him the phone number to preadmission nursing [phone] to get directions, etc.

## 2020-05-15 ENCOUNTER — ANESTHESIA (OUTPATIENT)
Dept: SURGERY | Facility: CLINIC | Age: 50
End: 2020-05-15
Payer: COMMERCIAL

## 2020-05-15 ENCOUNTER — HOSPITAL ENCOUNTER (OUTPATIENT)
Facility: CLINIC | Age: 50
Discharge: HOME | End: 2020-05-15
Attending: SURGERY | Admitting: SURGERY
Payer: COMMERCIAL

## 2020-05-15 VITALS
BODY MASS INDEX: 23.93 KG/M2 | HEART RATE: 53 BPM | SYSTOLIC BLOOD PRESSURE: 115 MMHG | RESPIRATION RATE: 20 BRPM | TEMPERATURE: 99.7 F | HEIGHT: 73 IN | DIASTOLIC BLOOD PRESSURE: 72 MMHG | OXYGEN SATURATION: 99 % | WEIGHT: 180.56 LBS

## 2020-05-15 DIAGNOSIS — K35.30 ACUTE APPENDICITIS WITH LOCALIZED PERITONITIS, UNSPECIFIED WHETHER ABSCESS PRESENT, UNSPECIFIED WHETHER GANGRENE PRESENT, UNSPECIFIED WHETHER PERFORATION PRESENT: ICD-10-CM

## 2020-05-15 LAB — GLUCOSE BLDC GLUCOMTR-MCNC: 111 MG/DL (ref 70–99)

## 2020-05-15 PROCEDURE — 25000128 H RX IP 250 OP 636: Performed by: NURSE ANESTHETIST, CERTIFIED REGISTERED

## 2020-05-15 PROCEDURE — 88304 TISSUE EXAM BY PATHOLOGIST: CPT | Mod: 26 | Performed by: SURGERY

## 2020-05-15 PROCEDURE — 37000009 ZZH ANESTHESIA TECHNICAL FEE, EACH ADDTL 15 MIN: Performed by: SURGERY

## 2020-05-15 PROCEDURE — 88304 TISSUE EXAM BY PATHOLOGIST: CPT | Performed by: SURGERY

## 2020-05-15 PROCEDURE — 36000057 ZZH SURGERY LEVEL 3 1ST 30 MIN - UMMC: Performed by: SURGERY

## 2020-05-15 PROCEDURE — 71000014 ZZH RECOVERY PHASE 1 LEVEL 2 FIRST HR: Performed by: SURGERY

## 2020-05-15 PROCEDURE — 37000008 ZZH ANESTHESIA TECHNICAL FEE, 1ST 30 MIN: Performed by: SURGERY

## 2020-05-15 PROCEDURE — 82962 GLUCOSE BLOOD TEST: CPT

## 2020-05-15 PROCEDURE — 25000566 ZZH SEVOFLURANE, EA 15 MIN: Performed by: SURGERY

## 2020-05-15 PROCEDURE — 25000128 H RX IP 250 OP 636: Performed by: SURGERY

## 2020-05-15 PROCEDURE — 25000125 ZZHC RX 250: Performed by: NURSE ANESTHETIST, CERTIFIED REGISTERED

## 2020-05-15 PROCEDURE — 25800030 ZZH RX IP 258 OP 636: Performed by: NURSE ANESTHETIST, CERTIFIED REGISTERED

## 2020-05-15 PROCEDURE — 40000170 ZZH STATISTIC PRE-PROCEDURE ASSESSMENT II: Performed by: SURGERY

## 2020-05-15 PROCEDURE — 71000027 ZZH RECOVERY PHASE 2 EACH 15 MINS: Performed by: SURGERY

## 2020-05-15 PROCEDURE — 25000132 ZZH RX MED GY IP 250 OP 250 PS 637: Performed by: ANESTHESIOLOGY

## 2020-05-15 PROCEDURE — 36000059 ZZH SURGERY LEVEL 3 EA 15 ADDTL MIN UMMC: Performed by: SURGERY

## 2020-05-15 PROCEDURE — 27210794 ZZH OR GENERAL SUPPLY STERILE: Performed by: SURGERY

## 2020-05-15 PROCEDURE — 25000125 ZZHC RX 250: Performed by: SURGERY

## 2020-05-15 RX ORDER — ONDANSETRON 2 MG/ML
4 INJECTION INTRAMUSCULAR; INTRAVENOUS EVERY 30 MIN PRN
Status: DISCONTINUED | OUTPATIENT
Start: 2020-05-15 | End: 2020-05-15 | Stop reason: HOSPADM

## 2020-05-15 RX ORDER — OXYCODONE HYDROCHLORIDE 5 MG/1
5 TABLET ORAL EVERY 4 HOURS PRN
Status: DISCONTINUED | OUTPATIENT
Start: 2020-05-15 | End: 2020-05-15 | Stop reason: HOSPADM

## 2020-05-15 RX ORDER — SODIUM CHLORIDE, SODIUM LACTATE, POTASSIUM CHLORIDE, CALCIUM CHLORIDE 600; 310; 30; 20 MG/100ML; MG/100ML; MG/100ML; MG/100ML
INJECTION, SOLUTION INTRAVENOUS CONTINUOUS
Status: DISCONTINUED | OUTPATIENT
Start: 2020-05-15 | End: 2020-05-15 | Stop reason: HOSPADM

## 2020-05-15 RX ORDER — ONDANSETRON 2 MG/ML
INJECTION INTRAMUSCULAR; INTRAVENOUS PRN
Status: DISCONTINUED | OUTPATIENT
Start: 2020-05-15 | End: 2020-05-15

## 2020-05-15 RX ORDER — NALOXONE HYDROCHLORIDE 0.4 MG/ML
.1-.4 INJECTION, SOLUTION INTRAMUSCULAR; INTRAVENOUS; SUBCUTANEOUS
Status: DISCONTINUED | OUTPATIENT
Start: 2020-05-15 | End: 2020-05-15 | Stop reason: HOSPADM

## 2020-05-15 RX ORDER — KETOROLAC TROMETHAMINE 30 MG/ML
INJECTION, SOLUTION INTRAMUSCULAR; INTRAVENOUS PRN
Status: DISCONTINUED | OUTPATIENT
Start: 2020-05-15 | End: 2020-05-15

## 2020-05-15 RX ORDER — SODIUM CHLORIDE, SODIUM LACTATE, POTASSIUM CHLORIDE, CALCIUM CHLORIDE 600; 310; 30; 20 MG/100ML; MG/100ML; MG/100ML; MG/100ML
INJECTION, SOLUTION INTRAVENOUS CONTINUOUS PRN
Status: DISCONTINUED | OUTPATIENT
Start: 2020-05-15 | End: 2020-05-15

## 2020-05-15 RX ORDER — LIDOCAINE HYDROCHLORIDE 20 MG/ML
INJECTION, SOLUTION INFILTRATION; PERINEURAL PRN
Status: DISCONTINUED | OUTPATIENT
Start: 2020-05-15 | End: 2020-05-15

## 2020-05-15 RX ORDER — BUPIVACAINE HYDROCHLORIDE 2.5 MG/ML
INJECTION, SOLUTION INFILTRATION; PERINEURAL PRN
Status: DISCONTINUED | OUTPATIENT
Start: 2020-05-15 | End: 2020-05-15 | Stop reason: HOSPADM

## 2020-05-15 RX ORDER — MAGNESIUM HYDROXIDE 1200 MG/15ML
LIQUID ORAL PRN
Status: DISCONTINUED | OUTPATIENT
Start: 2020-05-15 | End: 2020-05-15 | Stop reason: HOSPADM

## 2020-05-15 RX ORDER — PROPOFOL 10 MG/ML
INJECTION, EMULSION INTRAVENOUS PRN
Status: DISCONTINUED | OUTPATIENT
Start: 2020-05-15 | End: 2020-05-15

## 2020-05-15 RX ORDER — ACETAMINOPHEN 325 MG/1
650 TABLET ORAL ONCE
Status: COMPLETED | OUTPATIENT
Start: 2020-05-15 | End: 2020-05-15

## 2020-05-15 RX ORDER — FENTANYL CITRATE 50 UG/ML
INJECTION, SOLUTION INTRAMUSCULAR; INTRAVENOUS PRN
Status: DISCONTINUED | OUTPATIENT
Start: 2020-05-15 | End: 2020-05-15

## 2020-05-15 RX ORDER — OXYCODONE HYDROCHLORIDE 5 MG/1
5-10 TABLET ORAL EVERY 4 HOURS PRN
Qty: 10 TABLET | Refills: 0 | Status: SHIPPED | OUTPATIENT
Start: 2020-05-15 | End: 2022-07-06

## 2020-05-15 RX ORDER — HYDROMORPHONE HYDROCHLORIDE 1 MG/ML
.3-.5 INJECTION, SOLUTION INTRAMUSCULAR; INTRAVENOUS; SUBCUTANEOUS EVERY 10 MIN PRN
Status: DISCONTINUED | OUTPATIENT
Start: 2020-05-15 | End: 2020-05-15 | Stop reason: HOSPADM

## 2020-05-15 RX ORDER — ONDANSETRON 4 MG/1
4 TABLET, ORALLY DISINTEGRATING ORAL EVERY 30 MIN PRN
Status: DISCONTINUED | OUTPATIENT
Start: 2020-05-15 | End: 2020-05-15 | Stop reason: HOSPADM

## 2020-05-15 RX ORDER — MEPERIDINE HYDROCHLORIDE 25 MG/ML
12.5 INJECTION INTRAMUSCULAR; INTRAVENOUS; SUBCUTANEOUS
Status: DISCONTINUED | OUTPATIENT
Start: 2020-05-15 | End: 2020-05-15 | Stop reason: HOSPADM

## 2020-05-15 RX ORDER — DEXAMETHASONE SODIUM PHOSPHATE 4 MG/ML
INJECTION, SOLUTION INTRA-ARTICULAR; INTRALESIONAL; INTRAMUSCULAR; INTRAVENOUS; SOFT TISSUE PRN
Status: DISCONTINUED | OUTPATIENT
Start: 2020-05-15 | End: 2020-05-15

## 2020-05-15 RX ORDER — CEFAZOLIN SODIUM 2 G/100ML
INJECTION, SOLUTION INTRAVENOUS PRN
Status: DISCONTINUED | OUTPATIENT
Start: 2020-05-15 | End: 2020-05-15

## 2020-05-15 RX ORDER — FENTANYL CITRATE 50 UG/ML
25-50 INJECTION, SOLUTION INTRAMUSCULAR; INTRAVENOUS
Status: DISCONTINUED | OUTPATIENT
Start: 2020-05-15 | End: 2020-05-15 | Stop reason: HOSPADM

## 2020-05-15 RX ORDER — ACETAMINOPHEN 325 MG/1
650 TABLET ORAL
Status: DISCONTINUED | OUTPATIENT
Start: 2020-05-15 | End: 2020-05-15 | Stop reason: HOSPADM

## 2020-05-15 RX ORDER — OXYCODONE HYDROCHLORIDE 5 MG/1
5-10 TABLET ORAL EVERY 4 HOURS PRN
Qty: 10 TABLET | Refills: 0 | Status: SHIPPED | OUTPATIENT
Start: 2020-05-15 | End: 2020-05-15

## 2020-05-15 RX ADMIN — ONDANSETRON 4 MG: 2 INJECTION INTRAMUSCULAR; INTRAVENOUS at 08:57

## 2020-05-15 RX ADMIN — SODIUM CHLORIDE, POTASSIUM CHLORIDE, SODIUM LACTATE AND CALCIUM CHLORIDE: 600; 310; 30; 20 INJECTION, SOLUTION INTRAVENOUS at 09:05

## 2020-05-15 RX ADMIN — KETOROLAC TROMETHAMINE 30 MG: 30 INJECTION, SOLUTION INTRAMUSCULAR at 09:01

## 2020-05-15 RX ADMIN — ROCURONIUM BROMIDE 50 MG: 10 INJECTION INTRAVENOUS at 07:44

## 2020-05-15 RX ADMIN — PROPOFOL 150 MG: 10 INJECTION, EMULSION INTRAVENOUS at 07:45

## 2020-05-15 RX ADMIN — PHENYLEPHRINE HYDROCHLORIDE 100 MCG: 10 INJECTION INTRAVENOUS at 08:13

## 2020-05-15 RX ADMIN — ACETAMINOPHEN 650 MG: 325 TABLET, FILM COATED ORAL at 07:22

## 2020-05-15 RX ADMIN — DEXAMETHASONE SODIUM PHOSPHATE 4 MG: 4 INJECTION, SOLUTION INTRAMUSCULAR; INTRAVENOUS at 08:57

## 2020-05-15 RX ADMIN — FENTANYL CITRATE 100 MCG: 50 INJECTION, SOLUTION INTRAMUSCULAR; INTRAVENOUS at 07:44

## 2020-05-15 RX ADMIN — PHENYLEPHRINE HYDROCHLORIDE 100 MCG: 10 INJECTION INTRAVENOUS at 07:49

## 2020-05-15 RX ADMIN — PROPOFOL 50 MG: 10 INJECTION, EMULSION INTRAVENOUS at 07:47

## 2020-05-15 RX ADMIN — SODIUM CHLORIDE, POTASSIUM CHLORIDE, SODIUM LACTATE AND CALCIUM CHLORIDE: 600; 310; 30; 20 INJECTION, SOLUTION INTRAVENOUS at 07:41

## 2020-05-15 RX ADMIN — SUGAMMADEX 200 MG: 100 INJECTION, SOLUTION INTRAVENOUS at 09:08

## 2020-05-15 RX ADMIN — MIDAZOLAM 2 MG: 1 INJECTION INTRAMUSCULAR; INTRAVENOUS at 07:39

## 2020-05-15 RX ADMIN — CEFAZOLIN 2 G: 10 INJECTION, POWDER, FOR SOLUTION INTRAVENOUS at 07:48

## 2020-05-15 RX ADMIN — LIDOCAINE HYDROCHLORIDE 100 MG: 20 INJECTION, SOLUTION INFILTRATION; PERINEURAL at 07:44

## 2020-05-15 RX ADMIN — OXYCODONE HYDROCHLORIDE 5 MG: 5 TABLET ORAL at 11:15

## 2020-05-15 ASSESSMENT — MIFFLIN-ST. JEOR: SCORE: 1727.75

## 2020-05-15 NOTE — ANESTHESIA POSTPROCEDURE EVALUATION
Anesthesia POST Procedure Evaluation    Patient: Raghav Gudino   MRN:     [id number] Gender:   male   Age:    49 year old :      1970        Preoperative Diagnosis: Acute appendicitis with localized peritonitis, unspecified whether abscess present, unspecified whether gangrene present, unspecified whether perforation present [K35.30]   Procedure(s):  APPENDECTOMY, LAPAROSCOPIC   Postop Comments: No value filed.     Anesthesia Type: General       Disposition: Outpatient   Postop Pain Control: Uneventful            Sign Out: Well controlled pain   PONV: No   Neuro/Psych: Uneventful            Sign Out: Acceptable/Baseline neuro status   Airway/Respiratory: Uneventful            Sign Out: Acceptable/Baseline resp. status   CV/Hemodynamics: Uneventful            Sign Out: Acceptable CV status   Other NRE: NONE   DID A NON-ROUTINE EVENT OCCUR? No         Last Anesthesia Record Vitals:  CRNA VITALS  5/15/2020 0844 - 5/15/2020 0944      5/15/2020             Pulse:  68    SpO2:  99 %    Resp Rate (observed):  (!) 1          Last PACU Vitals:  Vitals Value Taken Time   /76 5/15/2020 10:15 AM   Temp 36.2  C (97.2  F) 5/15/2020  9:45 AM   Pulse 53 5/15/2020 10:15 AM   Resp 8 5/15/2020 10:11 AM   SpO2 99 % 5/15/2020 10:17 AM   Temp src     NIBP     Pulse     SpO2     Resp     Temp     Ht Rate     Temp 2     Vitals shown include unvalidated device data.      Electronically Signed By: Alva Crowe MD, May 15, 2020, 11:36 AM

## 2020-05-15 NOTE — PROGRESS NOTES
"Day of surgery note    Patient presents for appendectomy. Feeling at his baseline over the past several months. No complaints of chest pain or shortness of breath.     Exam:  /80 (BP Location: Right arm)   Pulse 58   Temp 98.1  F (36.7  C) (Oral)   Resp 18   Ht 1.854 m (6' 0.99\")   Wt 81.9 kg (180 lb 8.9 oz)   SpO2 98%   BMI 23.83 kg/m    Resting in bed, appears comfortable  Abdomen soft, non-distended  Regular heart rate and rhythm, no murmurs  Equal breath sounds bilaterally, no wheeze  Skin warm and dry, no rashes  Extremities normal, no edema    Discussed with patient the plan for laparoscopic appendectomy and answered all questions. Will proceed with surgery today with anticipation of discharge home today.    Mendez Allison MD  Surgery [phone]  "

## 2020-05-15 NOTE — OP NOTE
[facility] [place] [facility]    Operative Note    Pre-operative diagnosis: Acute appendicitis with localized peritonitis, unspecified whether abscess present, unspecified whether gangrene present, unspecified whether perforation present [K35.30]   Post-operative diagnosis Appendicitis   Procedure: Procedure(s):  APPENDECTOMY, LAPAROSCOPIC   Surgeon: Surgeon(s) and Role:     * Blair Amin MD - Primary     * Mendez Allison MD - Resident - Assisting   Anesthesia: General    Estimated blood loss: 5 mL   Drains: None   Specimens: ID Type Source Tests Collected by Time Destination   A :  Tissue Appendix SURGICAL PATHOLOGY EXAM Blair Amin MD 5/15/2020  8:17 AM       Findings: The appendix was chronically inflamed. There was no fluid near the appendix.  See photo below of specimen.   Complications: None.   Implants: None.       OPERATIVE INDICATIONS:  Raghav Gudino is a 49 year old-year-old male with a history of right lower quadrant abdominal pain months ago, diagnosed with appendicitis and treated with antibiotics. He presented to clinic for consideration of interval appendectomy. After understanding the risks and benefits of proceeding with surgery, the patient has an indication for laparoscopic appendectomy and consented to undergo surgery.    CT scan of the abdomen and pelvis showed a large appendicolith and retrocecal appendix.    OPERATIVE DETAILS:  The patient was brought to the operating room and prepared in a routine fashion.  A timeout was performed prior to surgery and documented by the nursing team.    Under the benefits of general anesthesia, a left upper quadrant Veress needle was inserted and pneumoperitoneum was established using carbon dioxide gas to a maximum pressure of 15 mmHg.  A total of 3 ports were placed and the 5 mm laparoscope was utilized.    The patient was moved into a position with the right side up.    The cecum was identified and the  appendix was identified at the confluence of the tenia coli.    The appendix was elevated and lysis of adhesions was performed to mobilize the appendix.    A window was made at the base of the appendix in the mesoappendix.  A white load endo-HARSHA stapler was then used to divide the appendix at its base and a short stump was left.    Next, the appendix was elevated and the mesoappendix was divided using a white load endo-HARSHA stapler.    The appendix was placed into an endocatch bag and removed from the 12mm port.    Hemostasis was confirmed and the staple line was cauterized.    The 12mm incision was closed using a single 0 Vicryl suture.    All skin incisions were closed using 4-0 monocryl suture and dermabond was applied.    I was present for all critical components of the operation, and all needle and sponge counts were correct x2 at the end of the procedure.    Operative note initiated by MD Blair Olivas MD  Surgery  [phone] (hospital )  [phone] (clinic nurses)

## 2020-05-15 NOTE — DISCHARGE INSTRUCTIONS
Same-Day Surgery   Adult Discharge Orders & Instructions     For 24 hours after surgery:  1. Get plenty of rest.  A responsible adult must stay with you for at least 24 hours after you leave the hospital.   2. Pain medication can slow your reflexes. Do not drive or use heavy equipment.  If you have weakness or tingling, don't drive or use heavy equipment until this feeling goes away.  3. Mixing alcohol and pain medication can cause dizziness and slow your breathing. It can even be fatal. Do not drink alcohol while taking pain medication.  4. Avoid strenuous or risky activities.  Ask for help when climbing stairs.   5. You may feel lightheaded.  If so, sit for a few minutes before standing.  Have someone help you get up.   6. If you have nausea (feel sick to your stomach), drink only clear liquids such as apple juice, ginger ale, broth or 7-Up.  Rest may also help.  Be sure to drink enough fluids.  Move to a regular diet as you feel able. Take pain medications with a small amount of solid food, such as toast or crackers, to avoid nausea.   7. A slight fever is normal. Call the doctor if your fever is over 100 F (37.7 C) (taken under the tongue) or lasts longer than 24 hours.  8. You may have a dry mouth, muscle aches, trouble sleeping or a sore throat.  These symptoms should go away after 24 hours.  9. Do not make important or legal decisions.   Pain Management:      1. Take pain medication (if prescribed) for pain as directed by your physician.        2. WARNING: If the pain medication you have been prescribed contains Tylenol  (acetaminophen), DO NOT take additional doses of Tylenol (acetaminophen).     Call your doctor for any of the followin.  Signs of infection (fever, growing tenderness at the surgery site, severe pain, a large amount of drainage or bleeding, foul-smelling drainage, redness, swelling).    2.  It has been over 8 to 10 hours since surgery and you are still not able to urinate (pee).    3.   Headache for over 24 hours.    4.  Numbness, tingling or weakness the day after surgery (if you had spinal anesthesia).  To contact a doctor, call Dr. Amin or:      [phone] and ask for the Resident On Call for:          General Surgery (answered 24 hours a day)      Emergency Department:  [place] [facility]: [phone]  [place] [facility]: [phone]          Discharge Instructions After an Appendectomy    You have had a procedure known as laparoscopic appendectomy to remove your appendix. The appendix is a worm-shaped hollow pouch attached to the beginning of your large intestine. During your procedure, the doctor made two to four small incisions. One was near your bellybutton, and the others were elsewhere on your abdomen. Through one incision, the doctor inserted a thin tube with a camera attached (called a laparoscope). Surgical tools were inserted in the other incisions.    Discomfort:  While you recover you may have discomfort in your shoulder and chest for up to 48 hours         after surgery. This is normal. It is caused by carbon dioxide (gas) used during the operation.     It will go away.   Wear loose-fitting clothes. This will help you be more comfortable and cause less irritation around your incisions.  Your doctor may prescribe pain medications to help ease the discomfort after surgery. Take these as directed.   Don t drive until you are no longer taking prescription pain medication.    Activity  Resume light activities around your home as soon as possible.  Don t lift anything heavier than 10 pounds until your doctor says it s okay.  Limit sports and strenuous activities for 1 or 2 weeks.  Shower as usual.  Gently wash around your incisions with soap and water.  Don t bathe or soak in a tub until your incisions are well healed.    Diet  Start with clear liquids and if you are not nauseated you may increase your diet as tolerated. Start by eating bland, low-fat foods  such as rice, jello, mashed potatoes, pudding, etc. Increase to a full diet as you feel comfortable with.   Drink 6 to 8 glasses of water a day, unless directed otherwise.  If you are constipated, take a fiber laxative such as Metamucil.    Follow-Up  Make a follow-up appointment as directed by your surgeon.  When to Call Your Doctor  Call your doctor immediately if you have any of the following:  Swelling, oozing, worsening pain, or unusual redness around the incision's  Fever of 100.4 F or higher  Increasing abdominal pain  Severe diarrhea, bloating, or constipation  Nausea or vomiting

## 2020-05-15 NOTE — ANESTHESIA CARE TRANSFER NOTE
Patient: Raghav Gudino    Procedure(s):  APPENDECTOMY, LAPAROSCOPIC    Diagnosis: Acute appendicitis with localized peritonitis, unspecified whether abscess present, unspecified whether gangrene present, unspecified whether perforation present [K35.30]  Diagnosis Additional Information: No value filed.    Anesthesia Type:   General     Note:  Airway :Face Mask  Patient transferred to:PACU  Comments: 137/73, HR 61, sat 100%, RR 16Handoff Report: Identifed the Patient, Identified the Reponsible Provider, Reviewed the pertinent medical history, Discussed the surgical course, Reviewed Intra-OP anesthesia mangement and issues during anesthesia, Set expectations for post-procedure period and Allowed opportunity for questions and acknowledgement of understanding      Vitals: (Last set prior to Anesthesia Care Transfer)    CRNA VITALS  5/15/2020 0844 - 5/15/2020 0924      5/15/2020             Pulse:  68    SpO2:  99 %    Resp Rate (observed):  (!) 1                Electronically Signed By: VINCENZO Benitez CRNA  May 15, 2020  9:24 AM

## 2020-05-18 ENCOUNTER — PATIENT OUTREACH (OUTPATIENT)
Dept: SURGERY | Facility: CLINIC | Age: 50
End: 2020-05-18

## 2020-05-18 NOTE — PROGRESS NOTES
Raghav Gudino is a patient of Dr. Brain Amin that underwent Laparoscopic appendectomy approximately 3 days ago.  Attempted to contact patient via telephone for a status update and review post op teaching.  LM on VM to call office.  Await return call.      Of note:  Pathology:  See below  Wound:  Skin Sealant  Follow-up:  Routine  Restrictions:  - No strenuous exercise for 3-4 weeks  - No lifting, pushing, pulling more than 15-20 pounds for 3-4 weeks  - Do not strain with bowel movements  - Do not drive until you can press the brake pedal quickly and fully without pain  - Do not operate a motor vehicle while taking narcotic pain medications  New medications:  Oxycodone  Equipment/Supplies:  None      Patient Name: RAGHAV GUDINO   MR#: [id number]   Specimen #: [id number]   Collected: 5/15/2020   Received: 5/15/2020   Reported: 5/19/2020 11:12   Ordering Phy(s): YOJANA AMIN     For improved result formatting, select 'View Enhanced Report Format' under    Linked Documents section.     SPECIMEN(S):   Appendix     FINAL DIAGNOSIS:   APPENDIX, APPENDECTOMY:   - Appendix with acute and chronic inflammation, including giant cell   reaction, consistent with history of   interval appendicitis   - Fecalith identified (1.3 cm in greatest dimension)     I have personally reviewed all specimens and/or slides, including the   listed special stains, and used them   with my medical judgement to determine or confirm the final diagnosis.     Electronically signed out by:     Svetlana Emanuel M.D., [facility]

## 2020-05-19 LAB — COPATH REPORT: NORMAL

## 2020-05-20 NOTE — PROGRESS NOTES
RN Post-Op/Post-Discharge Care Coordination Note    Spoke with Patient.    Support  Patient able to care for self independently     Health Status  Fevers/chills: Patient denies any fever or chills.  Nausea/Vomiting: Patient denies nausea/vomiting.  Eating/drinking: Patient is able to eat and drink without any complaints.  Bowel habits: Patient reports having a normal bowel movement. Took MOM over the weekend and hasn't needed to use it since.  Drains (FREDDY): N/A  Incisions: Patient denies any signs and symptoms of infection..  Wound closure:  Skin Sealant  Pain: patient had taken Oxycodone over the weekend at bedtime. He didn't take it yesterday and has been taking tylenol prn.  New Medications:  Oxycodone    Activity/Restrictions  No lifting in excess of 15-20 pounds for 3-4 weeks    Equipment  None    Pathology reviewed with patient:  Yes    Forms/Letters  No    All of his questions were answered including reviewing restrictions, pathology, and wound care.  He will call this office if he has any further questions and/or concerns.      In lieu of a post-op clinic patient that patient would like to be contacted in approximately 7-10 days via telephone.    A copy of this note was routed to the primary surgeon.      Whom and When to Call  Patient acknowledges understanding of how to manage any medication changes and   when to seek medical care.     Patient advised that if after hour medical concerns arise to please call [phone] and choose option 4 to speak to the physician on call.

## 2020-05-28 ENCOUNTER — PATIENT OUTREACH (OUTPATIENT)
Dept: SURGERY | Facility: CLINIC | Age: 50
End: 2020-05-28

## 2020-05-28 NOTE — PROGRESS NOTES
Raghav Gudino was contacted several days post procedure via telephone for a status update and elected to have a telephone follow -up call approximately 7-10 days after original contact in lieu of a clinic visit with Dr. Brain Amin.  He continues to do well and the skin sealant has started to peel off.  The patients wounds are healed and the area is C/D/I.  He is afebrile, mostly pain free, and ayleen po; the patient is slowly resuming his normal activity.   Discussed restrictions including no lifting in excess of 20 pounds for 3 weeks.    Pathology was reviewed with the patient: previously reviewed    All of Raghav Gudino question's were answered and  he would like to return to the clinic on a PRN basis.  The patient is aware that  he may schedule a post op appointment at any time.    A copy of this note was routed to the patients surgeon.

## 2020-12-11 DIAGNOSIS — K21.9 GASTROESOPHAGEAL REFLUX DISEASE WITHOUT ESOPHAGITIS: ICD-10-CM

## 2020-12-14 ENCOUNTER — HEALTH MAINTENANCE LETTER (OUTPATIENT)
Age: 50
End: 2020-12-14

## 2021-01-14 DIAGNOSIS — A09 TRAVELER'S DIARRHEA: Primary | ICD-10-CM

## 2021-01-14 RX ORDER — CIPROFLOXACIN 500 MG/1
500 TABLET, FILM COATED ORAL 2 TIMES DAILY
Qty: 10 TABLET | Refills: 1 | Status: SHIPPED | OUTPATIENT
Start: 2021-01-14 | End: 2022-07-06

## 2021-04-12 ENCOUNTER — IMMUNIZATION (OUTPATIENT)
Dept: NURSING | Facility: CLINIC | Age: 51
End: 2021-04-12
Payer: COMMERCIAL

## 2021-04-12 PROCEDURE — 91300 PR COVID VAC PFIZER DIL RECON 30 MCG/0.3 ML IM: CPT

## 2021-04-12 PROCEDURE — 0001A PR COVID VAC PFIZER DIL RECON 30 MCG/0.3 ML IM: CPT

## 2021-05-03 ENCOUNTER — IMMUNIZATION (OUTPATIENT)
Dept: NURSING | Facility: CLINIC | Age: 51
End: 2021-05-03
Attending: INTERNAL MEDICINE
Payer: COMMERCIAL

## 2021-05-03 PROCEDURE — 91300 PR COVID VAC PFIZER DIL RECON 30 MCG/0.3 ML IM: CPT

## 2021-05-03 PROCEDURE — 0002A PR COVID VAC PFIZER DIL RECON 30 MCG/0.3 ML IM: CPT

## 2021-10-02 ENCOUNTER — HEALTH MAINTENANCE LETTER (OUTPATIENT)
Age: 51
End: 2021-10-02

## 2022-01-22 ENCOUNTER — HEALTH MAINTENANCE LETTER (OUTPATIENT)
Age: 52
End: 2022-01-22

## 2022-05-13 ENCOUNTER — IMMUNIZATION (OUTPATIENT)
Dept: NURSING | Facility: CLINIC | Age: 52
End: 2022-05-13
Payer: COMMERCIAL

## 2022-05-13 PROCEDURE — 91305 COVID-19,PF,PFIZER (12+ YRS): CPT

## 2022-05-13 PROCEDURE — 0054A COVID-19,PF,PFIZER (12+ YRS): CPT

## 2022-05-20 DIAGNOSIS — Z78.9 H/O FOREIGN TRAVEL: Primary | ICD-10-CM

## 2022-05-20 RX ORDER — CIPROFLOXACIN 250 MG/1
250 TABLET, FILM COATED ORAL 2 TIMES DAILY
Qty: 20 TABLET | Refills: 0 | Status: SHIPPED | OUTPATIENT
Start: 2022-05-20 | End: 2022-07-06

## 2022-07-06 ENCOUNTER — OFFICE VISIT (OUTPATIENT)
Dept: INTERNAL MEDICINE | Facility: CLINIC | Age: 52
End: 2022-07-06
Payer: COMMERCIAL

## 2022-07-06 VITALS
WEIGHT: 188.4 LBS | BODY MASS INDEX: 24.97 KG/M2 | HEIGHT: 73 IN | DIASTOLIC BLOOD PRESSURE: 83 MMHG | RESPIRATION RATE: 12 BRPM | HEART RATE: 71 BPM | TEMPERATURE: 98.3 F | OXYGEN SATURATION: 100 % | SYSTOLIC BLOOD PRESSURE: 123 MMHG

## 2022-07-06 DIAGNOSIS — Z12.11 SPECIAL SCREENING FOR MALIGNANT NEOPLASMS, COLON: ICD-10-CM

## 2022-07-06 DIAGNOSIS — K21.9 GASTROESOPHAGEAL REFLUX DISEASE WITHOUT ESOPHAGITIS: ICD-10-CM

## 2022-07-06 DIAGNOSIS — Z78.9 H/O FOREIGN TRAVEL: Primary | ICD-10-CM

## 2022-07-06 LAB
ATRIAL RATE - MUSE: 60 BPM
DIASTOLIC BLOOD PRESSURE - MUSE: NORMAL MMHG
INTERPRETATION ECG - MUSE: NORMAL
P AXIS - MUSE: 54 DEGREES
PR INTERVAL - MUSE: 148 MS
QRS DURATION - MUSE: 88 MS
QT - MUSE: 398 MS
QTC - MUSE: 398 MS
R AXIS - MUSE: 53 DEGREES
SYSTOLIC BLOOD PRESSURE - MUSE: NORMAL MMHG
T AXIS - MUSE: 45 DEGREES
VENTRICULAR RATE- MUSE: 60 BPM

## 2022-07-06 PROCEDURE — 93010 ELECTROCARDIOGRAM REPORT: CPT | Performed by: INTERNAL MEDICINE

## 2022-07-06 PROCEDURE — 99396 PREV VISIT EST AGE 40-64: CPT | Mod: 25 | Performed by: INTERNAL MEDICINE

## 2022-07-06 ASSESSMENT — PAIN SCALES - GENERAL: PAINLEVEL: NO PAIN (0)

## 2022-07-06 NOTE — PROGRESS NOTES
"HPI  51-year-old  presents today for medical evaluation prior to his trip to the [employer].  He is exercising regularly doing a combination of cardio and strength training tolerating this well.  He is eating healthy.  He is sleeping well although somewhat fragmented.  He uses a moderate amount of alcohol non-smoker.  No specific complaints or concerns.  Past Medical History:   Diagnosis Date     Anxiety      Appendicitis 03/2020     GERD (gastroesophageal reflux disease)      Past Surgical History:   Procedure Laterality Date     ENT SURGERY      wisdom teeth out     HC ESOPH/GAS REFLUX TEST W NASAL IMPED >1 HR  10/20/2011    Procedure:ESOPHAGEAL IMPEDENCE FUNCTION TEST WITH 24 HOUR PH GREATER THAN 1 HOUR; Surgeon:SIMA CONCEPCION; Location:[facility]     LAPAROSCOPIC APPENDECTOMY N/A 5/15/2020    Procedure: APPENDECTOMY, LAPAROSCOPIC;  Surgeon: Blair Amin MD;  Location: [facility]     Family History   Problem Relation Age of Onset     Anesthesia Reaction No family hx of      Deep Vein Thrombosis (DVT) No family hx of          Exam:  /83   Pulse 71   Temp 98.3  F (36.8  C) (Oral)   Resp 12   Ht 1.854 m (6' 1\")   Wt 85.5 kg (188 lb 6.4 oz)   SpO2 100%   BMI 24.86 kg/m    188 lbs 6.4 oz  Physical Exam   The patient is alert, oriented with a clear sensorium.   Skin shows no lesions or rashes and good turgor.   Head is normocephalic and atraumatic.   Eyes show PERRLA with benign optic fundi.   Ears show normal TMs bilaterally.   Mouth shows clear oral mucosa.   Neck shows no nodes, thyromegaly or bruits.   Back is non tender.  Lungs are clear to percussion and auscultation.   Heart shows normal S1 and S2 without murmur or gallop.   Abdomen is soft and nontender without masses or organomegaly.   Genitalia show normal testes. No evidence of inguinal hernia.  Rectal shows small smooth prostate without nodules or masses.  Extremities show no edema and no evidence of active " synovitis.   Neurologic examination shows cranial nerves II-XII intact. Motor shows 5/5 strength. Reflexes are symmetric. Cerebellar testing shows normal tandem gait.  Romberg negative.  EKG reviewed showing normal sinus rhythm otherwise normal  The 10-year ASCVD risk score ([place] ALLISON Jr., et al., 2013) is: 2.4%    Values used to calculate the score:      Age: 51 years      Sex: Male      Is Non- : No      Diabetic: No      Tobacco smoker: No      Systolic Blood Pressure: 123 mmHg      Is BP treated: No      HDL Cholesterol: 76 mg/dL      Total Cholesterol: 172 mg/dL    ASSESSMENT  1 S/P appendectomy  2 GERD on omeprazole    Plan  Refilled his omeprazole ordered his labs required for the [place] along with his EKG we will follow-up with a colonoscopy and with influenza vaccine in the fall  This note was completed using Dragon voice recognition software.      Ebenezer Diallo MD  General Internal Medicine  [facility]  [phone]

## 2022-07-06 NOTE — NURSING NOTE
Chief Complaint   Patient presents with     Physical     Patient comes in for a physical exam.         Kyle Dick MA on 7/6/2022 at 5:05 PM

## 2022-07-07 ENCOUNTER — LAB (OUTPATIENT)
Dept: LAB | Facility: CLINIC | Age: 52
End: 2022-07-07
Payer: COMMERCIAL

## 2022-07-07 DIAGNOSIS — Z78.9 H/O FOREIGN TRAVEL: ICD-10-CM

## 2022-07-07 LAB
ABO/RH(D): NORMAL
ALBUMIN SERPL-MCNC: 3.8 G/DL (ref 3.4–5)
ALP SERPL-CCNC: 65 U/L (ref 40–150)
ALT SERPL W P-5'-P-CCNC: 33 U/L (ref 0–70)
ANION GAP SERPL CALCULATED.3IONS-SCNC: 7 MMOL/L (ref 3–14)
AST SERPL W P-5'-P-CCNC: 24 U/L (ref 0–45)
BASOPHILS # BLD AUTO: 0 10E3/UL (ref 0–0.2)
BASOPHILS NFR BLD AUTO: 0 %
BILIRUB DIRECT SERPL-MCNC: 0.2 MG/DL (ref 0–0.2)
BILIRUB SERPL-MCNC: 0.8 MG/DL (ref 0.2–1.3)
BUN SERPL-MCNC: 16 MG/DL (ref 7–30)
CALCIUM SERPL-MCNC: 8.6 MG/DL (ref 8.5–10.1)
CHLORIDE BLD-SCNC: 105 MMOL/L (ref 94–109)
CHOLEST SERPL-MCNC: 175 MG/DL
CO2 SERPL-SCNC: 27 MMOL/L (ref 20–32)
CREAT SERPL-MCNC: 1.03 MG/DL (ref 0.66–1.25)
EOSINOPHIL # BLD AUTO: 0.1 10E3/UL (ref 0–0.7)
EOSINOPHIL NFR BLD AUTO: 1 %
ERYTHROCYTE [DISTWIDTH] IN BLOOD BY AUTOMATED COUNT: 12.1 % (ref 10–15)
FASTING STATUS PATIENT QL REPORTED: YES
GFR SERPL CREATININE-BSD FRML MDRD: 87 ML/MIN/1.73M2
GLUCOSE BLD-MCNC: 96 MG/DL (ref 70–99)
HBV CORE AB SERPL QL IA: NONREACTIVE
HCT VFR BLD AUTO: 43.5 % (ref 40–53)
HCV AB SERPL QL IA: NONREACTIVE
HDLC SERPL-MCNC: 73 MG/DL
HGB BLD-MCNC: 14.9 G/DL (ref 13.3–17.7)
HIV 1+2 AB+HIV1 P24 AG SERPL QL IA: NONREACTIVE
IMM GRANULOCYTES # BLD: 0 10E3/UL
IMM GRANULOCYTES NFR BLD: 0 %
LDLC SERPL CALC-MCNC: 89 MG/DL
LYMPHOCYTES # BLD AUTO: 1.3 10E3/UL (ref 0.8–5.3)
LYMPHOCYTES NFR BLD AUTO: 32 %
MCH RBC QN AUTO: 31.6 PG (ref 26.5–33)
MCHC RBC AUTO-ENTMCNC: 34.3 G/DL (ref 31.5–36.5)
MCV RBC AUTO: 92 FL (ref 78–100)
MONOCYTES # BLD AUTO: 0.5 10E3/UL (ref 0–1.3)
MONOCYTES NFR BLD AUTO: 11 %
NEUTROPHILS # BLD AUTO: 2.2 10E3/UL (ref 1.6–8.3)
NEUTROPHILS NFR BLD AUTO: 56 %
NONHDLC SERPL-MCNC: 102 MG/DL
NRBC # BLD AUTO: 0 10E3/UL
NRBC BLD AUTO-RTO: 0 /100
PLATELET # BLD AUTO: 208 10E3/UL (ref 150–450)
POTASSIUM BLD-SCNC: 4.1 MMOL/L (ref 3.4–5.3)
PROT SERPL-MCNC: 6.8 G/DL (ref 6.8–8.8)
RBC # BLD AUTO: 4.72 10E6/UL (ref 4.4–5.9)
SODIUM SERPL-SCNC: 139 MMOL/L (ref 133–144)
SPECIMEN EXPIRATION DATE: NORMAL
T PALLIDUM AB SER QL: NONREACTIVE
TRIGL SERPL-MCNC: 66 MG/DL
WBC # BLD AUTO: 4 10E3/UL (ref 4–11)

## 2022-07-07 PROCEDURE — 87389 HIV-1 AG W/HIV-1&-2 AB AG IA: CPT | Mod: 90 | Performed by: PATHOLOGY

## 2022-07-07 PROCEDURE — 86901 BLOOD TYPING SEROLOGIC RH(D): CPT | Mod: 90 | Performed by: PATHOLOGY

## 2022-07-07 PROCEDURE — 86780 TREPONEMA PALLIDUM: CPT | Mod: 90 | Performed by: PATHOLOGY

## 2022-07-07 PROCEDURE — 86704 HEP B CORE ANTIBODY TOTAL: CPT | Mod: 90 | Performed by: PATHOLOGY

## 2022-07-07 PROCEDURE — 86803 HEPATITIS C AB TEST: CPT | Mod: 90 | Performed by: PATHOLOGY

## 2022-07-07 PROCEDURE — 85025 COMPLETE CBC W/AUTO DIFF WBC: CPT | Performed by: PATHOLOGY

## 2022-07-07 PROCEDURE — 86900 BLOOD TYPING SEROLOGIC ABO: CPT | Mod: 90 | Performed by: PATHOLOGY

## 2022-07-07 PROCEDURE — 80053 COMPREHEN METABOLIC PANEL: CPT | Performed by: PATHOLOGY

## 2022-07-07 PROCEDURE — 80061 LIPID PANEL: CPT | Performed by: PATHOLOGY

## 2022-07-07 PROCEDURE — 82248 BILIRUBIN DIRECT: CPT | Performed by: PATHOLOGY

## 2022-07-07 PROCEDURE — 99000 SPECIMEN HANDLING OFFICE-LAB: CPT | Performed by: PATHOLOGY

## 2022-07-07 PROCEDURE — 36415 COLL VENOUS BLD VENIPUNCTURE: CPT | Performed by: PATHOLOGY

## 2022-07-07 PROCEDURE — 86481 TB AG RESPONSE T-CELL SUSP: CPT | Mod: 90 | Performed by: PATHOLOGY

## 2022-07-08 LAB
GAMMA INTERFERON BACKGROUND BLD IA-ACNC: 0 IU/ML
M TB IFN-G BLD-IMP: NEGATIVE
M TB IFN-G CD4+ BCKGRND COR BLD-ACNC: 10 IU/ML
MITOGEN IGNF BCKGRD COR BLD-ACNC: 0.03 IU/ML
MITOGEN IGNF BCKGRD COR BLD-ACNC: 0.04 IU/ML
QUANTIFERON MITOGEN: 10 IU/ML
QUANTIFERON NIL TUBE: 0 IU/ML
QUANTIFERON TB1 TUBE: 0.03 IU/ML
QUANTIFERON TB2 TUBE: 0.04

## 2022-07-11 ENCOUNTER — LAB (OUTPATIENT)
Dept: LAB | Facility: CLINIC | Age: 52
End: 2022-07-11
Payer: COMMERCIAL

## 2022-07-11 DIAGNOSIS — Z78.9 H/O FOREIGN TRAVEL: ICD-10-CM

## 2022-07-11 LAB — HEMOCCULT STL QL IA: NEGATIVE

## 2022-07-11 PROCEDURE — 99000 SPECIMEN HANDLING OFFICE-LAB: CPT | Performed by: PATHOLOGY

## 2022-07-11 PROCEDURE — 82274 ASSAY TEST FOR BLOOD FECAL: CPT | Mod: 90 | Performed by: PATHOLOGY

## 2022-07-21 ENCOUNTER — MYC MEDICAL ADVICE (OUTPATIENT)
Dept: INTERNAL MEDICINE | Facility: CLINIC | Age: 52
End: 2022-07-21

## 2023-01-14 ENCOUNTER — HEALTH MAINTENANCE LETTER (OUTPATIENT)
Age: 53
End: 2023-01-14

## 2023-07-14 ENCOUNTER — LAB (OUTPATIENT)
Dept: LAB | Facility: CLINIC | Age: 53
End: 2023-07-14
Payer: COMMERCIAL

## 2023-07-14 ENCOUNTER — OFFICE VISIT (OUTPATIENT)
Dept: INTERNAL MEDICINE | Facility: CLINIC | Age: 53
End: 2023-07-14
Payer: COMMERCIAL

## 2023-07-14 VITALS
WEIGHT: 182.1 LBS | OXYGEN SATURATION: 97 % | BODY MASS INDEX: 24.13 KG/M2 | HEIGHT: 73 IN | DIASTOLIC BLOOD PRESSURE: 74 MMHG | HEART RATE: 74 BPM | SYSTOLIC BLOOD PRESSURE: 131 MMHG

## 2023-07-14 DIAGNOSIS — Z78.9 H/O FOREIGN TRAVEL: Primary | ICD-10-CM

## 2023-07-14 DIAGNOSIS — K21.9 GASTROESOPHAGEAL REFLUX DISEASE WITHOUT ESOPHAGITIS: ICD-10-CM

## 2023-07-14 DIAGNOSIS — Z78.9 H/O FOREIGN TRAVEL: ICD-10-CM

## 2023-07-14 PROCEDURE — 93000 ELECTROCARDIOGRAM COMPLETE: CPT | Performed by: INTERNAL MEDICINE

## 2023-07-14 PROCEDURE — 99396 PREV VISIT EST AGE 40-64: CPT | Mod: 25 | Performed by: INTERNAL MEDICINE

## 2023-07-14 NOTE — PROGRESS NOTES
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  He has been feeling well.  He does have some stiffness and aching in the joints periodically.  We discussed the use of a nonsteroidal on a as needed basis.  He is taking omeprazole on a daily basis for GERD and he has been tolerating this well and is asymptomatic in this regard.  He is sleeping well his diet is satisfactory alcohol consumption is minimal to moderate and he is doing regular weight training.  He has had some shoulder discomfort on the left recently and we reviewed rotator cuff exercises for this  Past Medical History:   Diagnosis Date     Anxiety      Appendicitis 03/2020     GERD (gastroesophageal reflux disease)      Past Surgical History:   Procedure Laterality Date     ENT SURGERY      wisdom teeth out     HC ESOPH/GAS REFLUX TEST W NASAL IMPED >1 HR  10/20/2011    Procedure:ESOPHAGEAL IMPEDENCE FUNCTION TEST WITH 24 HOUR PH GREATER THAN 1 HOUR; Surgeon:SIMA CONCEPCION; Location: GI     LAPAROSCOPIC APPENDECTOMY N/A 5/15/2020    Procedure: APPENDECTOMY, LAPAROSCOPIC;  Surgeon: Blair Amin MD;  Location:  OR     Family History   Problem Relation Age of Onset     Anesthesia Reaction No family hx of      Deep Vein Thrombosis (DVT) No family hx of      Answers for HPI/ROS submitted by the patient on 7/14/2023  General Symptoms: No  Skin Symptoms: No  HENT Symptoms: No  EYE SYMPTOMS: No  HEART SYMPTOMS: No  LUNG SYMPTOMS: No  INTESTINAL SYMPTOMS: No  URINARY SYMPTOMS: No  REPRODUCTIVE SYMPTOMS: No  SKELETAL SYMPTOMS: No  BLOOD SYMPTOMS: No  NERVOUS SYSTEM SYMPTOMS: No  MENTAL HEALTH SYMPTOMS: No        Exam:  /74 (BP Location: Right arm, Patient Position: Sitting, Cuff Size: Adult Regular)   Pulse 74   Ht 1.854 m (6' 0.99\")   Wt 82.6 kg (182 lb 1.6 oz)   SpO2 97%   BMI 24.03 kg/m    182 lbs 1.6 oz  Physical Exam   The patient is alert, oriented with a clear " sensorium.   Skin shows no lesions or rashes and good turgor.   Head is normocephalic and atraumatic.   Eyes show PERRLA with benign optic fundi.   Ears show normal left TM scarring on left.   Mouth shows clear oral mucosa.   Neck shows no nodes, thyromegaly or bruits.   Back is non tender.  Lungs are clear to percussion and auscultation.   Heart shows normal S1 and S2 without murmur or gallop.   Abdomen is soft and nontender without masses or organomegaly.   Genitalia show normal testes. No evidence of inguinal hernia.  Rectal shows small smooth prostate without nodules or masses.  Extremities show no edema and no evidence of active synovitis.   Neurologic examination shows cranial nerves II-XII intact. Motor shows 5/5 strength. Reflexes are symmetric. Cerebellar testing shows normal tandem gait.  Romberg negative.  EKG reviewed and was normal and unchanged    ASSESSMENT  1 Myalgias and arthralgias likely mild DJD  2 GERD on omeprazole    Plan  We will order the labs he needs for the [facility] physician we will do FIT testing for colon cancer screening we will update his immunizations with a COVID booster and a flu vaccine next month.    This note was completed using Dragon voice recognition software.      Ebenezer Diallo MD  General Internal Medicine  [facility]  [phone]

## 2023-07-14 NOTE — NURSING NOTE
"Raghav Gudino is a 52 year old male patient that presents today in clinic for the following:    Chief Complaint   Patient presents with     Physical     Pt needing a medical exam completed for travel to [facility] in January or February      The patient's allergies and medications were reviewed as noted. A set of vitals were recorded as noted without incident: /74 (BP Location: Right arm, Patient Position: Sitting, Cuff Size: Adult Regular)   Pulse 74   Ht 1.854 m (6' 0.99\")   Wt 82.6 kg (182 lb 1.6 oz)   SpO2 97%   BMI 24.03 kg/m  . The patient does not have any other questions for the provider.    Clay Allen, EMT 1:57 PM on 7/14/2023   "

## 2023-07-17 ENCOUNTER — LAB (OUTPATIENT)
Dept: LAB | Facility: CLINIC | Age: 53
End: 2023-07-17
Payer: COMMERCIAL

## 2023-07-17 DIAGNOSIS — Z78.9 H/O FOREIGN TRAVEL: ICD-10-CM

## 2023-07-17 LAB
ABO/RH(D): NORMAL
ALBUMIN SERPL BCG-MCNC: 4.5 G/DL (ref 3.5–5.2)
ALP SERPL-CCNC: 63 U/L (ref 40–129)
ALT SERPL W P-5'-P-CCNC: 23 U/L (ref 0–70)
ANION GAP SERPL CALCULATED.3IONS-SCNC: 9 MMOL/L (ref 7–15)
AST SERPL W P-5'-P-CCNC: 26 U/L (ref 0–45)
ATRIAL RATE - MUSE: 72 BPM
BASOPHILS # BLD AUTO: 0 10E3/UL (ref 0–0.2)
BASOPHILS NFR BLD AUTO: 1 %
BILIRUB DIRECT SERPL-MCNC: <0.2 MG/DL (ref 0–0.3)
BILIRUB SERPL-MCNC: 0.7 MG/DL
BUN SERPL-MCNC: 16.3 MG/DL (ref 6–20)
CALCIUM SERPL-MCNC: 9.2 MG/DL (ref 8.6–10)
CHLORIDE SERPL-SCNC: 102 MMOL/L (ref 98–107)
CHOLEST SERPL-MCNC: 168 MG/DL
CREAT SERPL-MCNC: 1.04 MG/DL (ref 0.67–1.17)
DEPRECATED HCO3 PLAS-SCNC: 26 MMOL/L (ref 22–29)
DIASTOLIC BLOOD PRESSURE - MUSE: NORMAL MMHG
EOSINOPHIL # BLD AUTO: 0.1 10E3/UL (ref 0–0.7)
EOSINOPHIL NFR BLD AUTO: 2 %
ERYTHROCYTE [DISTWIDTH] IN BLOOD BY AUTOMATED COUNT: 12.1 % (ref 10–15)
GFR SERPL CREATININE-BSD FRML MDRD: 85 ML/MIN/1.73M2
GLUCOSE SERPL-MCNC: 114 MG/DL (ref 70–99)
HBV CORE AB SERPL QL IA: NONREACTIVE
HCT VFR BLD AUTO: 41.5 % (ref 40–53)
HCV AB SERPL QL IA: NONREACTIVE
HDLC SERPL-MCNC: 77 MG/DL
HEMOCCULT STL QL IA: NEGATIVE
HGB BLD-MCNC: 14.1 G/DL (ref 13.3–17.7)
HIV 1+2 AB+HIV1 P24 AG SERPL QL IA: NONREACTIVE
IMM GRANULOCYTES # BLD: 0 10E3/UL
IMM GRANULOCYTES NFR BLD: 0 %
INTERPRETATION ECG - MUSE: NORMAL
LDLC SERPL CALC-MCNC: 78 MG/DL
LYMPHOCYTES # BLD AUTO: 1.1 10E3/UL (ref 0.8–5.3)
LYMPHOCYTES NFR BLD AUTO: 32 %
MCH RBC QN AUTO: 31.6 PG (ref 26.5–33)
MCHC RBC AUTO-ENTMCNC: 34 G/DL (ref 31.5–36.5)
MCV RBC AUTO: 93 FL (ref 78–100)
MONOCYTES # BLD AUTO: 0.3 10E3/UL (ref 0–1.3)
MONOCYTES NFR BLD AUTO: 9 %
NEUTROPHILS # BLD AUTO: 1.9 10E3/UL (ref 1.6–8.3)
NEUTROPHILS NFR BLD AUTO: 56 %
NONHDLC SERPL-MCNC: 91 MG/DL
NRBC # BLD AUTO: 0 10E3/UL
NRBC BLD AUTO-RTO: 0 /100
P AXIS - MUSE: 67 DEGREES
PLATELET # BLD AUTO: 207 10E3/UL (ref 150–450)
POTASSIUM SERPL-SCNC: 4 MMOL/L (ref 3.4–5.3)
PR INTERVAL - MUSE: 156 MS
PROT SERPL-MCNC: 6.9 G/DL (ref 6.4–8.3)
QRS DURATION - MUSE: 88 MS
QT - MUSE: 380 MS
QTC - MUSE: 416 MS
R AXIS - MUSE: 77 DEGREES
RBC # BLD AUTO: 4.46 10E6/UL (ref 4.4–5.9)
SODIUM SERPL-SCNC: 137 MMOL/L (ref 136–145)
SPECIMEN EXPIRATION DATE: NORMAL
SYSTOLIC BLOOD PRESSURE - MUSE: NORMAL MMHG
T AXIS - MUSE: 65 DEGREES
T PALLIDUM AB SER QL: NONREACTIVE
TRIGL SERPL-MCNC: 67 MG/DL
VENTRICULAR RATE- MUSE: 72 BPM
WBC # BLD AUTO: 3.4 10E3/UL (ref 4–11)

## 2023-07-17 PROCEDURE — 80061 LIPID PANEL: CPT | Performed by: PATHOLOGY

## 2023-07-17 PROCEDURE — 36415 COLL VENOUS BLD VENIPUNCTURE: CPT | Performed by: PATHOLOGY

## 2023-07-17 PROCEDURE — 86780 TREPONEMA PALLIDUM: CPT | Performed by: INTERNAL MEDICINE

## 2023-07-17 PROCEDURE — 86803 HEPATITIS C AB TEST: CPT | Performed by: INTERNAL MEDICINE

## 2023-07-17 PROCEDURE — 82274 ASSAY TEST FOR BLOOD FECAL: CPT | Performed by: INTERNAL MEDICINE

## 2023-07-17 PROCEDURE — 87389 HIV-1 AG W/HIV-1&-2 AB AG IA: CPT | Performed by: INTERNAL MEDICINE

## 2023-07-17 PROCEDURE — 86481 TB AG RESPONSE T-CELL SUSP: CPT | Performed by: INTERNAL MEDICINE

## 2023-07-17 PROCEDURE — 99000 SPECIMEN HANDLING OFFICE-LAB: CPT | Performed by: PATHOLOGY

## 2023-07-17 PROCEDURE — 85025 COMPLETE CBC W/AUTO DIFF WBC: CPT | Performed by: PATHOLOGY

## 2023-07-17 PROCEDURE — 86900 BLOOD TYPING SEROLOGIC ABO: CPT | Performed by: INTERNAL MEDICINE

## 2023-07-17 PROCEDURE — 86704 HEP B CORE ANTIBODY TOTAL: CPT | Performed by: INTERNAL MEDICINE

## 2023-07-17 PROCEDURE — 80053 COMPREHEN METABOLIC PANEL: CPT | Performed by: PATHOLOGY

## 2023-07-17 PROCEDURE — 82248 BILIRUBIN DIRECT: CPT | Performed by: PATHOLOGY

## 2023-07-18 LAB
QUANTIFERON MITOGEN: 10 IU/ML
QUANTIFERON NIL TUBE: 0 IU/ML
QUANTIFERON TB1 TUBE: 0.06 IU/ML
QUANTIFERON TB2 TUBE: 0.14

## 2023-07-19 LAB
GAMMA INTERFERON BACKGROUND BLD IA-ACNC: 0 IU/ML
M TB IFN-G BLD-IMP: NEGATIVE
M TB IFN-G CD4+ BCKGRND COR BLD-ACNC: 10 IU/ML
MITOGEN IGNF BCKGRD COR BLD-ACNC: 0.06 IU/ML
MITOGEN IGNF BCKGRD COR BLD-ACNC: 0.14 IU/ML

## 2023-07-25 DIAGNOSIS — R73.02 IGT (IMPAIRED GLUCOSE TOLERANCE): Primary | ICD-10-CM

## 2023-07-25 PROCEDURE — 99207 PR NO CHARGE LOS: CPT | Performed by: INTERNAL MEDICINE

## 2023-07-25 NOTE — PROGRESS NOTES
Phone call visit regarding his recent email questioning the elevated blood sugar.  He going back the last 12 years has been running blood sugars in the 90s to low 100.  Now up to 114.  Is not aware of any family history of diabetes he exercises he is not obese his diet is good.  We will plan to repeat the blood sugar and we will check his A1c.  He also indicates that he has had a tender lump in the anus recently.  Thinks it might be a hemorrhoid we discussed using sitz bath's hydrocortisone cream see and if it does not subside with that.  We will schedule a nurse appointment for the COVID-vaccine  Ebenezer Diallo MD

## 2023-08-23 ENCOUNTER — ALLIED HEALTH/NURSE VISIT (OUTPATIENT)
Dept: INTERNAL MEDICINE | Facility: CLINIC | Age: 53
End: 2023-08-23
Payer: COMMERCIAL

## 2023-08-23 ENCOUNTER — LAB (OUTPATIENT)
Dept: LAB | Facility: CLINIC | Age: 53
End: 2023-08-23
Payer: COMMERCIAL

## 2023-08-23 DIAGNOSIS — R73.02 IGT (IMPAIRED GLUCOSE TOLERANCE): ICD-10-CM

## 2023-08-23 DIAGNOSIS — Z23 NEED FOR VACCINATION: Primary | ICD-10-CM

## 2023-08-23 LAB
FASTING STATUS PATIENT QL REPORTED: YES
GLUCOSE SERPL-MCNC: 111 MG/DL (ref 70–99)
HBA1C MFR BLD: 5.8 %

## 2023-08-23 PROCEDURE — 91312 COVID-19 BIVALENT 12+ (PFIZER): CPT

## 2023-08-23 PROCEDURE — 99000 SPECIMEN HANDLING OFFICE-LAB: CPT | Performed by: PATHOLOGY

## 2023-08-23 PROCEDURE — 0124A COVID-19 BIVALENT 12+ (PFIZER): CPT

## 2023-08-23 PROCEDURE — 83036 HEMOGLOBIN GLYCOSYLATED A1C: CPT | Performed by: INTERNAL MEDICINE

## 2023-08-23 PROCEDURE — 82947 ASSAY GLUCOSE BLOOD QUANT: CPT | Performed by: PATHOLOGY

## 2023-08-23 PROCEDURE — 36415 COLL VENOUS BLD VENIPUNCTURE: CPT | Performed by: PATHOLOGY

## 2023-08-23 NOTE — PROGRESS NOTES
Raghav Gudino received the COVID Booster today in clinic at the request of Dr. Diallo/ the patient. The immunization was given under the supervision of Dr. Patrick if assistance was needed. The patient does not report a history of adverse reactions associated with vaccine administration. The immunization site was cleaned with an alcohol prep wipe. The immunization was given without incident--see immunization list for administration details. No swelling or redness was observed at the site of injection after the immunization was given. The patient was advised to remain in fourth floor lobby of the [facility] for fifteen minutes after the injection in case of an adverse reaction.     Pt is traveling to [facility] and needed vaccination for work. Dr. Diallo placed COVID booster Immunization Order.       DMITRY Doan at 10:11 AM on 8/23/2023.

## 2024-09-08 ENCOUNTER — HEALTH MAINTENANCE LETTER (OUTPATIENT)
Age: 54
End: 2024-09-08

## 2024-09-16 ENCOUNTER — LAB (OUTPATIENT)
Dept: LAB | Facility: CLINIC | Age: 54
End: 2024-09-16
Payer: COMMERCIAL

## 2024-09-16 ENCOUNTER — OFFICE VISIT (OUTPATIENT)
Dept: INTERNAL MEDICINE | Facility: CLINIC | Age: 54
End: 2024-09-16
Payer: COMMERCIAL

## 2024-09-16 VITALS
BODY MASS INDEX: 25.37 KG/M2 | OXYGEN SATURATION: 95 % | HEIGHT: 73 IN | DIASTOLIC BLOOD PRESSURE: 85 MMHG | WEIGHT: 191.4 LBS | SYSTOLIC BLOOD PRESSURE: 129 MMHG | HEART RATE: 74 BPM

## 2024-09-16 DIAGNOSIS — Z12.11 SCREEN FOR COLON CANCER: Primary | ICD-10-CM

## 2024-09-16 DIAGNOSIS — Z12.11 SCREEN FOR COLON CANCER: ICD-10-CM

## 2024-09-16 DIAGNOSIS — Z56.89 OCCUPATIONAL HEALTH PROBLEM: ICD-10-CM

## 2024-09-16 DIAGNOSIS — R73.02 IGT (IMPAIRED GLUCOSE TOLERANCE): ICD-10-CM

## 2024-09-16 DIAGNOSIS — K21.9 GASTROESOPHAGEAL REFLUX DISEASE WITHOUT ESOPHAGITIS: ICD-10-CM

## 2024-09-16 LAB
ATRIAL RATE - MUSE: 61 BPM
DIASTOLIC BLOOD PRESSURE - MUSE: NORMAL MMHG
INTERPRETATION ECG - MUSE: NORMAL
P AXIS - MUSE: 32 DEGREES
PR INTERVAL - MUSE: 154 MS
QRS DURATION - MUSE: 88 MS
QT - MUSE: 390 MS
QTC - MUSE: 392 MS
R AXIS - MUSE: 52 DEGREES
SYSTOLIC BLOOD PRESSURE - MUSE: NORMAL MMHG
T AXIS - MUSE: 47 DEGREES
VENTRICULAR RATE- MUSE: 61 BPM

## 2024-09-16 PROCEDURE — 93000 ELECTROCARDIOGRAM COMPLETE: CPT | Performed by: INTERNAL MEDICINE

## 2024-09-16 PROCEDURE — 90656 IIV3 VACC NO PRSV 0.5 ML IM: CPT | Performed by: INTERNAL MEDICINE

## 2024-09-16 PROCEDURE — 99396 PREV VISIT EST AGE 40-64: CPT | Mod: 25 | Performed by: INTERNAL MEDICINE

## 2024-09-16 PROCEDURE — 90471 IMMUNIZATION ADMIN: CPT | Performed by: INTERNAL MEDICINE

## 2024-09-16 SDOH — HEALTH STABILITY: PHYSICAL HEALTH: ON AVERAGE, HOW MANY DAYS PER WEEK DO YOU ENGAGE IN MODERATE TO STRENUOUS EXERCISE (LIKE A BRISK WALK)?: 7 DAYS

## 2024-09-16 NOTE — PROGRESS NOTES
"HPI  53-year-old  presents today for physical exam prior to his [place] trip this winter.  He spent the past year in [place] he admits that he was overindulging in calories and drinking more alcohol and as a result his weight is up.  He is exercising doing predominantly strength training but some cardio and we discussed ramping this up.  He is also improved his diet and cut way back on the alcohol.  We discussed his blood sugar and his impaired glucose tolerance.  Otherwise he has been feeling well has no other complaints or concerns  Past Medical History:   Diagnosis Date    Anxiety     Appendicitis 03/2020    GERD (gastroesophageal reflux disease)      Past Surgical History:   Procedure Laterality Date    ENT SURGERY      wisdom teeth out    HC ESOPH/GAS REFLUX TEST W NASAL IMPED >1 HR  10/20/2011    Procedure:ESOPHAGEAL IMPEDENCE FUNCTION TEST WITH 24 HOUR PH GREATER THAN 1 HOUR; Surgeon:SIMA CONCEPCION; Location:[facility]    LAPAROSCOPIC APPENDECTOMY N/A 5/15/2020    Procedure: APPENDECTOMY, LAPAROSCOPIC;  Surgeon: Blair Amin MD;  Location:  OR     Family History   Problem Relation Age of Onset    Anesthesia Reaction No family hx of     Deep Vein Thrombosis (DVT) No family hx of          Exam:  /85 (BP Location: Left arm, Patient Position: Sitting, Cuff Size: Adult Regular)   Pulse 74   Ht 1.854 m (6' 0.99\")   Wt 86.8 kg (191 lb 6.4 oz)   SpO2 95%   BMI 25.26 kg/m    191 lbs 6.4 oz  Physical Exam   The patient is alert, oriented with a clear sensorium.   Skin shows no lesions or rashes and good turgor.   Head is normocephalic and atraumatic.   Eyes show PERRLA with benign optic fundi.   Ears show normal TMs bilaterally.   Mouth shows clear oral mucosa.   Neck shows no nodes, thyromegaly or bruits.   Back is non tender.  Lungs are clear to percussion and auscultation.   Heart shows normal S1 and S2 without murmur or gallop.   Abdomen is soft and nontender without masses " or organomegaly.   Genitalia show normal testes. No evidence of inguinal hernia.  Rectal shows small smooth prostate without nodules or masses.  Extremities show no edema and no evidence of active synovitis.   Neurologic examination shows cranial nerves II-XII intact. Motor shows 5/5 strength. Reflexes are symmetric. Cerebellar testing shows normal tandem gait.  Romberg negative.  EKG was reviewed showing poor R wave progression but no change from 7/14/2023 and no significant abnormalities.  Small R waves were noted in V1 through V3    ASSESSMENT  1 DJD  2 GERD on omeprazole  3 IGT      Plan  Will do his an article labs EKG FIT testing will immunize for flu and COVID when available.  Also gave him a CGM for monitoring his blood sugar at home.  This note was completed using Dragon voice recognition software.      Ebenezer Diallo MD  General Internal Medicine  [facility]  [phone]

## 2024-09-16 NOTE — PROGRESS NOTES
Raphael is a 53 year old that presents in clinic today for the following:     Chief Complaint   Patient presents with    Physical      antar[place] program form           9/16/2024     3:59 PM   Additional Questions   Roomed by KTR         9/16/2024   Forms   Any forms needing to be completed Yes        Screenings as of today     PHQ-2 Total Score (Adult) - Positive if 3 or more points; Administer   PHQ-9 if positive 0    Fallen 2 or more times in the past year?  No        Candelaria Hayes, EMT at 4:03 PM on 9/16/2024

## 2024-09-19 ENCOUNTER — LAB (OUTPATIENT)
Dept: LAB | Facility: CLINIC | Age: 54
End: 2024-09-19
Payer: COMMERCIAL

## 2024-09-19 DIAGNOSIS — R73.02 IGT (IMPAIRED GLUCOSE TOLERANCE): ICD-10-CM

## 2024-09-19 DIAGNOSIS — Z56.89 OCCUPATIONAL HEALTH PROBLEM: ICD-10-CM

## 2024-09-19 LAB
ABO/RH(D): NORMAL
ALBUMIN SERPL BCG-MCNC: 4.4 G/DL (ref 3.5–5.2)
ALP SERPL-CCNC: 65 U/L (ref 40–150)
ALT SERPL W P-5'-P-CCNC: 26 U/L (ref 0–70)
ANION GAP SERPL CALCULATED.3IONS-SCNC: 9 MMOL/L (ref 7–15)
AST SERPL W P-5'-P-CCNC: 23 U/L (ref 0–45)
BASOPHILS # BLD AUTO: 0 10E3/UL (ref 0–0.2)
BASOPHILS NFR BLD AUTO: 1 %
BILIRUB DIRECT SERPL-MCNC: <0.2 MG/DL (ref 0–0.3)
BILIRUB SERPL-MCNC: 0.6 MG/DL
BUN SERPL-MCNC: 14.5 MG/DL (ref 6–20)
CALCIUM SERPL-MCNC: 9 MG/DL (ref 8.8–10.4)
CHLORIDE SERPL-SCNC: 103 MMOL/L (ref 98–107)
CHOLEST SERPL-MCNC: 165 MG/DL
CREAT SERPL-MCNC: 1.14 MG/DL (ref 0.67–1.17)
EGFRCR SERPLBLD CKD-EPI 2021: 76 ML/MIN/1.73M2
EOSINOPHIL # BLD AUTO: 0 10E3/UL (ref 0–0.7)
EOSINOPHIL NFR BLD AUTO: 1 %
ERYTHROCYTE [DISTWIDTH] IN BLOOD BY AUTOMATED COUNT: 12.1 % (ref 10–15)
EST. AVERAGE GLUCOSE BLD GHB EST-MCNC: 117 MG/DL
FASTING STATUS PATIENT QL REPORTED: YES
FASTING STATUS PATIENT QL REPORTED: YES
GLUCOSE SERPL-MCNC: 103 MG/DL (ref 70–99)
HBA1C MFR BLD: 5.7 %
HBV CORE AB SERPL QL IA: NONREACTIVE
HCO3 SERPL-SCNC: 27 MMOL/L (ref 22–29)
HCT VFR BLD AUTO: 43.1 % (ref 40–53)
HCV AB SERPL QL IA: NONREACTIVE
HDLC SERPL-MCNC: 63 MG/DL
HGB BLD-MCNC: 14.8 G/DL (ref 13.3–17.7)
HIV 1+2 AB+HIV1 P24 AG SERPL QL IA: NONREACTIVE
IMM GRANULOCYTES # BLD: 0 10E3/UL
IMM GRANULOCYTES NFR BLD: 0 %
LDLC SERPL CALC-MCNC: 90 MG/DL
LYMPHOCYTES # BLD AUTO: 1.1 10E3/UL (ref 0.8–5.3)
LYMPHOCYTES NFR BLD AUTO: 30 %
MCH RBC QN AUTO: 31.8 PG (ref 26.5–33)
MCHC RBC AUTO-ENTMCNC: 34.3 G/DL (ref 31.5–36.5)
MCV RBC AUTO: 93 FL (ref 78–100)
MONOCYTES # BLD AUTO: 0.3 10E3/UL (ref 0–1.3)
MONOCYTES NFR BLD AUTO: 9 %
NEUTROPHILS # BLD AUTO: 2.1 10E3/UL (ref 1.6–8.3)
NEUTROPHILS NFR BLD AUTO: 59 %
NONHDLC SERPL-MCNC: 102 MG/DL
NRBC # BLD AUTO: 0 10E3/UL
NRBC BLD AUTO-RTO: 0 /100
PLATELET # BLD AUTO: 215 10E3/UL (ref 150–450)
POTASSIUM SERPL-SCNC: 4.4 MMOL/L (ref 3.4–5.3)
PROT SERPL-MCNC: 6.8 G/DL (ref 6.4–8.3)
RBC # BLD AUTO: 4.65 10E6/UL (ref 4.4–5.9)
SODIUM SERPL-SCNC: 139 MMOL/L (ref 135–145)
SPECIMEN EXPIRATION DATE: NORMAL
T PALLIDUM AB SER QL: NONREACTIVE
TRIGL SERPL-MCNC: 62 MG/DL
WBC # BLD AUTO: 3.5 10E3/UL (ref 4–11)

## 2024-09-19 PROCEDURE — 86704 HEP B CORE ANTIBODY TOTAL: CPT | Performed by: INTERNAL MEDICINE

## 2024-09-19 PROCEDURE — 82248 BILIRUBIN DIRECT: CPT | Performed by: PATHOLOGY

## 2024-09-19 PROCEDURE — 86780 TREPONEMA PALLIDUM: CPT | Performed by: INTERNAL MEDICINE

## 2024-09-19 PROCEDURE — 85025 COMPLETE CBC W/AUTO DIFF WBC: CPT | Performed by: PATHOLOGY

## 2024-09-19 PROCEDURE — 86900 BLOOD TYPING SEROLOGIC ABO: CPT

## 2024-09-19 PROCEDURE — 36415 COLL VENOUS BLD VENIPUNCTURE: CPT | Performed by: PATHOLOGY

## 2024-09-19 PROCEDURE — 82274 ASSAY TEST FOR BLOOD FECAL: CPT | Performed by: INTERNAL MEDICINE

## 2024-09-19 PROCEDURE — 83036 HEMOGLOBIN GLYCOSYLATED A1C: CPT | Performed by: INTERNAL MEDICINE

## 2024-09-19 PROCEDURE — 80053 COMPREHEN METABOLIC PANEL: CPT | Performed by: PATHOLOGY

## 2024-09-19 PROCEDURE — 86803 HEPATITIS C AB TEST: CPT | Performed by: INTERNAL MEDICINE

## 2024-09-19 PROCEDURE — 99000 SPECIMEN HANDLING OFFICE-LAB: CPT | Performed by: PATHOLOGY

## 2024-09-19 PROCEDURE — 80061 LIPID PANEL: CPT | Performed by: PATHOLOGY

## 2024-09-19 PROCEDURE — 86481 TB AG RESPONSE T-CELL SUSP: CPT | Performed by: INTERNAL MEDICINE

## 2024-09-19 PROCEDURE — 87389 HIV-1 AG W/HIV-1&-2 AB AG IA: CPT | Performed by: INTERNAL MEDICINE

## 2024-09-20 LAB
GAMMA INTERFERON BACKGROUND BLD IA-ACNC: 0.02 IU/ML
HEMOCCULT STL QL IA: NEGATIVE
M TB IFN-G BLD-IMP: NEGATIVE
M TB IFN-G CD4+ BCKGRND COR BLD-ACNC: 9.98 IU/ML
MITOGEN IGNF BCKGRD COR BLD-ACNC: 0.02 IU/ML
MITOGEN IGNF BCKGRD COR BLD-ACNC: 0.06 IU/ML
QUANTIFERON MITOGEN: 10 IU/ML
QUANTIFERON NIL TUBE: 0.02 IU/ML
QUANTIFERON TB1 TUBE: 0.08 IU/ML
QUANTIFERON TB2 TUBE: 0.04

## 2024-10-21 ENCOUNTER — MYC MEDICAL ADVICE (OUTPATIENT)
Dept: INTERNAL MEDICINE | Facility: CLINIC | Age: 54
End: 2024-10-21
Payer: COMMERCIAL

## 2024-11-06 ENCOUNTER — ALLIED HEALTH/NURSE VISIT (OUTPATIENT)
Dept: INTERNAL MEDICINE | Facility: CLINIC | Age: 54
End: 2024-11-06
Payer: COMMERCIAL

## 2024-11-06 DIAGNOSIS — Z23 NEED FOR VACCINATION: Primary | ICD-10-CM

## 2024-11-06 PROCEDURE — 99207 PR NO CHARGE NURSE ONLY: CPT

## 2024-11-06 PROCEDURE — 90480 ADMN SARSCOV2 VAC 1/ONLY CMP: CPT

## 2024-11-06 PROCEDURE — 91320 SARSCV2 VAC 30MCG TRS-SUC IM: CPT

## 2024-11-06 NOTE — PROGRESS NOTES
Raghav Gudino received the Covid-19 vaccine today in clinic at the request of the patient. The immunization was given under the supervision of Dr. Patrick if assistance was needed. The patient does not report a history of adverse reactions associated with vaccine administration. The immunization site was cleaned with an alcohol prep wipe. The immunization was given without incident--see immunization list for administration details. No swelling or redness was observed at the site of injection after the immunization was given. The patient was advised to remain in fourth floor lobby of the [facility] for fifteen minutes after the injection in case of an adverse reaction.     Yaneth Rosas, EMT at 2:18 PM on 11/6/2024

## 2024-11-12 DIAGNOSIS — Z56.89 OCCUPATIONAL HEALTH PROBLEM: Primary | ICD-10-CM

## 2024-11-12 PROCEDURE — 99207 EKG 12-LEAD COMPLETE W/READ - CLINICS: CPT | Performed by: INTERNAL MEDICINE

## 2024-11-13 ENCOUNTER — TELEPHONE (OUTPATIENT)
Dept: INTERNAL MEDICINE | Facility: CLINIC | Age: 54
End: 2024-11-13
Payer: COMMERCIAL

## 2024-11-13 NOTE — TELEPHONE ENCOUNTER
Patient confirmed scheduled appointment:  Date: Nov 18th   Time: 9:30am  Visit type: Nurse Visit   Provider: PANTERA Nurse  Location: [facility]  Additional notes: per message - Please contact patient to schedule a nurse appointment for an EKG

## 2024-11-18 ENCOUNTER — ALLIED HEALTH/NURSE VISIT (OUTPATIENT)
Dept: INTERNAL MEDICINE | Facility: CLINIC | Age: 54
End: 2024-11-18
Payer: COMMERCIAL

## 2024-11-18 DIAGNOSIS — Z56.89 OCCUPATIONAL HEALTH PROBLEM: Primary | ICD-10-CM

## 2024-11-18 LAB
ATRIAL RATE - MUSE: 63 BPM
DIASTOLIC BLOOD PRESSURE - MUSE: NORMAL MMHG
INTERPRETATION ECG - MUSE: NORMAL
P AXIS - MUSE: 35 DEGREES
PR INTERVAL - MUSE: 152 MS
QRS DURATION - MUSE: 80 MS
QT - MUSE: 384 MS
QTC - MUSE: 392 MS
R AXIS - MUSE: 52 DEGREES
SYSTOLIC BLOOD PRESSURE - MUSE: NORMAL MMHG
T AXIS - MUSE: 51 DEGREES
VENTRICULAR RATE- MUSE: 63 BPM

## 2024-11-18 NOTE — PROGRESS NOTES
Patient presented in clinic for a repeat EKG per Dr. Diallo. The EKG was completed in this nurse visit and reading of EKG was done by Dr. Diallo prior to the patient leaving the office.     Yaneth Rosas, EMT at 9:46 AM on 11/18/2024

## 2025-02-19 DIAGNOSIS — Z91.89 AT RISK FOR INFECTIOUS DISEASE DUE TO RECENT FOREIGN TRAVEL: Primary | ICD-10-CM

## 2025-02-19 RX ORDER — AZITHROMYCIN 250 MG/1
TABLET, FILM COATED ORAL
Qty: 10 TABLET | Refills: 0 | Status: SHIPPED | OUTPATIENT
Start: 2025-02-19

## 2025-07-07 ENCOUNTER — MYC REFILL (OUTPATIENT)
Dept: INTERNAL MEDICINE | Facility: CLINIC | Age: 55
End: 2025-07-07
Payer: COMMERCIAL

## 2025-07-07 DIAGNOSIS — K21.9 GASTROESOPHAGEAL REFLUX DISEASE WITHOUT ESOPHAGITIS: ICD-10-CM

## 2025-07-10 RX ORDER — OMEPRAZOLE 20 MG/1
20 CAPSULE, DELAYED RELEASE ORAL EVERY MORNING
Qty: 90 CAPSULE | Refills: 3 | Status: SHIPPED | OUTPATIENT
Start: 2025-07-10

## 2025-08-06 ENCOUNTER — OFFICE VISIT (OUTPATIENT)
Dept: INTERNAL MEDICINE | Facility: CLINIC | Age: 55
End: 2025-08-06
Payer: COMMERCIAL

## 2025-08-06 VITALS
TEMPERATURE: 98 F | SYSTOLIC BLOOD PRESSURE: 127 MMHG | WEIGHT: 190.8 LBS | HEIGHT: 73 IN | DIASTOLIC BLOOD PRESSURE: 84 MMHG | BODY MASS INDEX: 25.29 KG/M2 | OXYGEN SATURATION: 98 % | HEART RATE: 68 BPM

## 2025-08-06 DIAGNOSIS — Z56.89 OCCUPATIONAL HEALTH PROBLEM: Primary | ICD-10-CM

## 2025-08-06 DIAGNOSIS — R73.02 IGT (IMPAIRED GLUCOSE TOLERANCE): ICD-10-CM

## 2025-08-06 DIAGNOSIS — K21.9 GASTROESOPHAGEAL REFLUX DISEASE WITHOUT ESOPHAGITIS: ICD-10-CM

## 2025-08-06 DIAGNOSIS — Z12.11 SCREEN FOR COLON CANCER: ICD-10-CM

## 2025-08-06 RX ORDER — FAMOTIDINE 40 MG/1
40 TABLET, FILM COATED ORAL AT BEDTIME
Qty: 90 TABLET | Refills: 3 | Status: SHIPPED | OUTPATIENT
Start: 2025-08-06

## 2025-08-06 RX ORDER — OMEPRAZOLE 20 MG/1
20 CAPSULE, DELAYED RELEASE ORAL EVERY MORNING
Qty: 90 CAPSULE | Refills: 3 | Status: SHIPPED | OUTPATIENT
Start: 2025-08-06

## 2025-08-06 SDOH — HEALTH STABILITY: PHYSICAL HEALTH: ON AVERAGE, HOW MANY MINUTES DO YOU ENGAGE IN EXERCISE AT THIS LEVEL?: 30 MIN

## 2025-08-06 SDOH — HEALTH STABILITY: PHYSICAL HEALTH: ON AVERAGE, HOW MANY DAYS PER WEEK DO YOU ENGAGE IN MODERATE TO STRENUOUS EXERCISE (LIKE A BRISK WALK)?: 6 DAYS

## 2025-08-06 ASSESSMENT — SOCIAL DETERMINANTS OF HEALTH (SDOH): HOW OFTEN DO YOU GET TOGETHER WITH FRIENDS OR RELATIVES?: ONCE A WEEK

## 2025-08-07 LAB
ATRIAL RATE - MUSE: 56 BPM
DIASTOLIC BLOOD PRESSURE - MUSE: NORMAL MMHG
INTERPRETATION ECG - MUSE: NORMAL
P AXIS - MUSE: 33 DEGREES
PR INTERVAL - MUSE: 166 MS
QRS DURATION - MUSE: 94 MS
QT - MUSE: 406 MS
QTC - MUSE: 391 MS
R AXIS - MUSE: 51 DEGREES
SYSTOLIC BLOOD PRESSURE - MUSE: NORMAL MMHG
T AXIS - MUSE: 48 DEGREES
VENTRICULAR RATE- MUSE: 56 BPM

## 2025-08-08 PROCEDURE — 86481 TB AG RESPONSE T-CELL SUSP: CPT | Performed by: INTERNAL MEDICINE

## 2025-08-08 PROCEDURE — 86780 TREPONEMA PALLIDUM: CPT | Performed by: INTERNAL MEDICINE

## 2025-08-08 PROCEDURE — 87389 HIV-1 AG W/HIV-1&-2 AB AG IA: CPT | Performed by: INTERNAL MEDICINE

## 2025-08-08 PROCEDURE — 86704 HEP B CORE ANTIBODY TOTAL: CPT | Performed by: INTERNAL MEDICINE

## 2025-08-08 PROCEDURE — 86803 HEPATITIS C AB TEST: CPT | Performed by: INTERNAL MEDICINE

## 2025-08-08 PROCEDURE — 99000 SPECIMEN HANDLING OFFICE-LAB: CPT | Performed by: PATHOLOGY

## (undated) DEVICE — SYR 10ML LL W/O NDL 302995

## (undated) DEVICE — STPL POWERED ECHELON 60MM PSEE60A

## (undated) DEVICE — LINEN GOWN X4 5410

## (undated) DEVICE — SOL WATER IRRIG 1000ML BOTTLE 2F7114

## (undated) DEVICE — SU MONOCRYL 4-0 PS-2 18" UND Y496G

## (undated) DEVICE — ENDO DISSECTOR BLUNT 05MM  BTD05

## (undated) DEVICE — SOL NACL 0.9% IRRIG 1000ML BOTTLE 2F7124

## (undated) DEVICE — PREP CHLORAPREP 26ML TINTED ORANGE  260815

## (undated) DEVICE — LINEN TOWEL PACK X5 5464

## (undated) DEVICE — ESU GROUND PAD UNIVERSAL W/O CORD

## (undated) DEVICE — SU VICRYL 0 UR-6 27" J603H

## (undated) DEVICE — SYR 10ML FINGER CONTROL W/O NDL 309695

## (undated) DEVICE — ENDO SCOPE WARMER LF TM500

## (undated) DEVICE — ENDO POUCH UNIV RETRIEVAL SYSTEM INZII 10MM CD001

## (undated) DEVICE — ENDO TROCAR FIRST ENTRY KII FIOS Z-THRD 05X100MM CTF03

## (undated) DEVICE — ESU HOLDER LAP INST DISP PURPLE LONG 330MM H-PRO-330

## (undated) DEVICE — CATH TRAY FOLEY SURESTEP 16FR WDRAIN BAG STLK LATEX A300316A

## (undated) DEVICE — ENDO TROCAR FIRST ENTRY KII FIOS Z-THRD 12X100MM CTF73

## (undated) DEVICE — GLOVE PROTEXIS W/NEU-THERA 7.5  2D73TE75

## (undated) DEVICE — DRSG PRIMAPORE 02X3" 7133

## (undated) DEVICE — DECANTER TRANSFER DEVICE 2008S

## (undated) DEVICE — DEVICE SUTURE PASSER 14GA WECK EFX EFXSP2

## (undated) DEVICE — ENDO TROCAR SLEEVE KII Z-THREADED 05X100MM CTS02

## (undated) DEVICE — TUBING INSUFFLATION W/FILTER 10FT GS1016

## (undated) DEVICE — Device

## (undated) DEVICE — LIGHT HANDLE X2

## (undated) DEVICE — LINEN ORTHO PACK 5446

## (undated) DEVICE — SUCTION MANIFOLD DORNOCH ULTRA CART UL-CL500

## (undated) DEVICE — BLADE CLIPPER SGL USE 9680

## (undated) DEVICE — ADH SKIN CLOSURE PREMIERPRO EXOFIN 1.0ML 3470

## (undated) DEVICE — SOL NACL 0.9% INJ 1000ML BAG 2B1324X

## (undated) DEVICE — COVER CAMERA IN-LIGHT DISP LT-C02

## (undated) RX ORDER — ACETAMINOPHEN 325 MG/1
TABLET ORAL
Status: DISPENSED
Start: 2020-05-15

## (undated) RX ORDER — FENTANYL CITRATE 50 UG/ML
INJECTION, SOLUTION INTRAMUSCULAR; INTRAVENOUS
Status: DISPENSED
Start: 2020-05-15

## (undated) RX ORDER — CEFAZOLIN SODIUM 2 G/100ML
INJECTION, SOLUTION INTRAVENOUS
Status: DISPENSED
Start: 2020-05-15

## (undated) RX ORDER — HYDROMORPHONE HYDROCHLORIDE 1 MG/ML
INJECTION, SOLUTION INTRAMUSCULAR; INTRAVENOUS; SUBCUTANEOUS
Status: DISPENSED
Start: 2020-05-15

## (undated) RX ORDER — OXYCODONE HYDROCHLORIDE 5 MG/1
TABLET ORAL
Status: DISPENSED
Start: 2020-05-15

## (undated) RX ORDER — BUPIVACAINE HYDROCHLORIDE 2.5 MG/ML
INJECTION, SOLUTION EPIDURAL; INFILTRATION; INTRACAUDAL
Status: DISPENSED
Start: 2020-05-15